# Patient Record
Sex: MALE | Race: WHITE | URBAN - METROPOLITAN AREA
[De-identification: names, ages, dates, MRNs, and addresses within clinical notes are randomized per-mention and may not be internally consistent; named-entity substitution may affect disease eponyms.]

---

## 2017-01-16 RX ORDER — METHYLPHENIDATE HYDROCHLORIDE 20 MG/1
20 TABLET ORAL 2 TIMES DAILY
Qty: 60 TABLET | Refills: 0 | Status: SHIPPED | OUTPATIENT
Start: 2017-01-16 | End: 2017-02-14 | Stop reason: SDUPTHER

## 2017-02-14 RX ORDER — METHYLPHENIDATE HYDROCHLORIDE 20 MG/1
20 TABLET ORAL 2 TIMES DAILY
Qty: 60 TABLET | Refills: 0 | Status: SHIPPED | OUTPATIENT
Start: 2017-02-14 | End: 2017-03-16 | Stop reason: SDUPTHER

## 2017-03-16 RX ORDER — METHYLPHENIDATE HYDROCHLORIDE 20 MG/1
20 TABLET ORAL 2 TIMES DAILY
Qty: 60 TABLET | Refills: 0 | Status: SHIPPED | OUTPATIENT
Start: 2017-03-16 | End: 2017-04-17 | Stop reason: SDUPTHER

## 2017-04-17 RX ORDER — METHYLPHENIDATE HYDROCHLORIDE 20 MG/1
20 TABLET ORAL 2 TIMES DAILY
Qty: 60 TABLET | Refills: 0 | Status: SHIPPED | OUTPATIENT
Start: 2017-04-17 | End: 2017-05-16 | Stop reason: SDUPTHER

## 2017-05-16 RX ORDER — METHYLPHENIDATE HYDROCHLORIDE 20 MG/1
20 TABLET ORAL 2 TIMES DAILY
Qty: 60 TABLET | Refills: 0 | Status: SHIPPED | OUTPATIENT
Start: 2017-05-16 | End: 2017-06-14 | Stop reason: SDUPTHER

## 2017-06-14 RX ORDER — METHYLPHENIDATE HYDROCHLORIDE 20 MG/1
20 TABLET ORAL 2 TIMES DAILY
Qty: 60 TABLET | Refills: 0 | Status: SHIPPED | OUTPATIENT
Start: 2017-06-14 | End: 2017-07-14 | Stop reason: SDUPTHER

## 2017-07-14 ENCOUNTER — TELEPHONE (OUTPATIENT)
Dept: FAMILY MEDICINE CLINIC | Age: 37
End: 2017-07-14

## 2017-07-14 RX ORDER — METHYLPHENIDATE HYDROCHLORIDE 20 MG/1
20 TABLET ORAL 2 TIMES DAILY
Qty: 60 TABLET | Refills: 0 | Status: SHIPPED | OUTPATIENT
Start: 2017-07-14 | End: 2017-08-14 | Stop reason: SDUPTHER

## 2017-08-14 ENCOUNTER — TELEPHONE (OUTPATIENT)
Dept: FAMILY MEDICINE CLINIC | Age: 37
End: 2017-08-14

## 2017-08-14 RX ORDER — METHYLPHENIDATE HYDROCHLORIDE 20 MG/1
20 TABLET ORAL 2 TIMES DAILY
Qty: 60 TABLET | Refills: 0 | Status: SHIPPED | OUTPATIENT
Start: 2017-08-14 | End: 2017-09-12 | Stop reason: SDUPTHER

## 2017-09-12 ENCOUNTER — TELEPHONE (OUTPATIENT)
Dept: FAMILY MEDICINE CLINIC | Age: 37
End: 2017-09-12

## 2017-09-12 RX ORDER — METHYLPHENIDATE HYDROCHLORIDE 20 MG/1
20 TABLET ORAL 2 TIMES DAILY
Qty: 60 TABLET | Refills: 0 | Status: SHIPPED | OUTPATIENT
Start: 2017-09-12 | End: 2017-10-12 | Stop reason: SDUPTHER

## 2017-10-12 ENCOUNTER — TELEPHONE (OUTPATIENT)
Dept: FAMILY MEDICINE CLINIC | Age: 37
End: 2017-10-12

## 2017-10-12 RX ORDER — METHYLPHENIDATE HYDROCHLORIDE 20 MG/1
20 TABLET ORAL 2 TIMES DAILY
Qty: 60 TABLET | Refills: 0 | Status: SHIPPED | OUTPATIENT
Start: 2017-10-12 | End: 2017-11-13 | Stop reason: SDUPTHER

## 2017-10-31 ENCOUNTER — TELEPHONE (OUTPATIENT)
Dept: FAMILY MEDICINE CLINIC | Age: 37
End: 2017-10-31

## 2017-10-31 RX ORDER — FLUOXETINE HYDROCHLORIDE 20 MG/1
20 CAPSULE ORAL DAILY
Qty: 30 CAPSULE | Refills: 1 | Status: SHIPPED | OUTPATIENT
Start: 2017-10-31 | End: 2017-11-22 | Stop reason: SDUPTHER

## 2017-10-31 NOTE — TELEPHONE ENCOUNTER
Disp Refills Start End    FLUoxetine (PROZAC) 20 MG capsule 30 capsule 12 10/20/2016     Sig - Route:  Take 1 capsule by mouth daily - Oral      [facility] [address] in [place] in chart    Next appointment scheduled 11/22/17

## 2017-11-11 ENCOUNTER — TELEPHONE (OUTPATIENT)
Dept: FAMILY MEDICINE CLINIC | Age: 37
End: 2017-11-11

## 2017-11-13 RX ORDER — METHYLPHENIDATE HYDROCHLORIDE 20 MG/1
20 TABLET ORAL 2 TIMES DAILY
Qty: 60 TABLET | Refills: 0 | Status: SHIPPED | OUTPATIENT
Start: 2017-11-13 | End: 2017-12-11 | Stop reason: SDUPTHER

## 2017-11-22 ENCOUNTER — OFFICE VISIT (OUTPATIENT)
Dept: FAMILY MEDICINE CLINIC | Age: 37
End: 2017-11-22

## 2017-11-22 VITALS
DIASTOLIC BLOOD PRESSURE: 70 MMHG | SYSTOLIC BLOOD PRESSURE: 118 MMHG | OXYGEN SATURATION: 99 % | BODY MASS INDEX: 25.84 KG/M2 | HEART RATE: 70 BPM | WEIGHT: 184 LBS

## 2017-11-22 DIAGNOSIS — Z83.3 FAMILY HISTORY OF DIABETES MELLITUS: ICD-10-CM

## 2017-11-22 DIAGNOSIS — Z13.220 ENCOUNTER FOR LIPID SCREENING FOR CARDIOVASCULAR DISEASE: ICD-10-CM

## 2017-11-22 DIAGNOSIS — Z13.6 ENCOUNTER FOR LIPID SCREENING FOR CARDIOVASCULAR DISEASE: ICD-10-CM

## 2017-11-22 DIAGNOSIS — Z11.4 SCREENING FOR HIV (HUMAN IMMUNODEFICIENCY VIRUS): ICD-10-CM

## 2017-11-22 DIAGNOSIS — F60.5 OBSESSIVE-COMPULSIVE PERSONALITY DISORDER (HCC): Primary | ICD-10-CM

## 2017-11-22 DIAGNOSIS — F17.200 TOBACCO DEPENDENCE: ICD-10-CM

## 2017-11-22 DIAGNOSIS — S06.9X0S TRAUMATIC BRAIN INJURY, WITHOUT LOSS OF CONSCIOUSNESS, SEQUELA (HCC): ICD-10-CM

## 2017-11-22 DIAGNOSIS — L64.9 MALE PATTERN BALDNESS: ICD-10-CM

## 2017-11-22 DIAGNOSIS — Z23 NEED FOR VACCINATION: ICD-10-CM

## 2017-11-22 PROBLEM — S06.9XAA TBI (TRAUMATIC BRAIN INJURY) (HCC): Status: ACTIVE | Noted: 2017-11-22

## 2017-11-22 PROBLEM — S06.9XAA TBI (TRAUMATIC BRAIN INJURY) (HCC): Status: RESOLVED | Noted: 2017-11-22 | Resolved: 2017-11-22

## 2017-11-22 PROBLEM — S06.9XAA TBI (TRAUMATIC BRAIN INJURY): Status: ACTIVE | Noted: 2017-11-22

## 2017-11-22 PROCEDURE — G8419 CALC BMI OUT NRM PARAM NOF/U: HCPCS | Performed by: FAMILY MEDICINE

## 2017-11-22 PROCEDURE — 90715 TDAP VACCINE 7 YRS/> IM: CPT | Performed by: FAMILY MEDICINE

## 2017-11-22 PROCEDURE — G8427 DOCREV CUR MEDS BY ELIG CLIN: HCPCS | Performed by: FAMILY MEDICINE

## 2017-11-22 PROCEDURE — G0008 ADMIN INFLUENZA VIRUS VAC: HCPCS | Performed by: FAMILY MEDICINE

## 2017-11-22 PROCEDURE — 4004F PT TOBACCO SCREEN RCVD TLK: CPT | Performed by: FAMILY MEDICINE

## 2017-11-22 PROCEDURE — 90471 IMMUNIZATION ADMIN: CPT | Performed by: FAMILY MEDICINE

## 2017-11-22 PROCEDURE — 99214 OFFICE O/P EST MOD 30 MIN: CPT | Performed by: FAMILY MEDICINE

## 2017-11-22 PROCEDURE — 90630 INFLUENZA, QUADV, 18-64 YRS, ID, PF, MICRO INJ, 0.1ML (FLUZONE QUADV, PF): CPT | Performed by: FAMILY MEDICINE

## 2017-11-22 PROCEDURE — G8484 FLU IMMUNIZE NO ADMIN: HCPCS | Performed by: FAMILY MEDICINE

## 2017-11-22 RX ORDER — FLUOXETINE HYDROCHLORIDE 20 MG/1
20 CAPSULE ORAL DAILY
Qty: 90 CAPSULE | Refills: 3 | Status: SHIPPED | OUTPATIENT
Start: 2017-11-22 | End: 2018-11-29 | Stop reason: SDUPTHER

## 2017-11-22 ASSESSMENT — PATIENT HEALTH QUESTIONNAIRE - PHQ9
2. FEELING DOWN, DEPRESSED OR HOPELESS: 0
1. LITTLE INTEREST OR PLEASURE IN DOING THINGS: 0
SUM OF ALL RESPONSES TO PHQ QUESTIONS 1-9: 0
SUM OF ALL RESPONSES TO PHQ9 QUESTIONS 1 & 2: 0

## 2017-11-22 NOTE — PROGRESS NOTES
Subjective:      Patient ID: Steven Jain is a 44 y.o. male. HPI patient presents today for his annual physical and medication check up. Patient is here today to follow up for their chronic conditions. Feels like OCD and anxiety are stable. Doing well with prozac. Focus and concentration are better with ritalin. Just bought a condo, excited about that. Doesn't like that has receding hair line. Tried rogaine but didn't seem to help. Patient's medications, allergies, past medical, surgical, social and family histories were reviewed and updated in the EHR as appropriate. Review of Systems    Objective:   Physical Exam  Body mass index is 25.84 kg/m². Vitals:    11/22/17 1124   BP: 118/70   Site: Left Arm   Position: Sitting   Cuff Size: Large Adult   Pulse: 70   SpO2: 99%   Weight: 184 lb (83.5 kg)     Wt Readings from Last 3 Encounters:   11/22/17 184 lb (83.5 kg)   11/03/16 176 lb (79.8 kg)   10/06/15 162 lb (73.5 kg)       GENERAL:Alert and oriented x 4 NAD, normal appearing weight, well hydrated, well developed. NECK:supple and non tender without mass, no thyromegaly or thyroid nodules, no cervical lymphadenopathy  LUNG:clear to auscultation bilaterally with normal respiratory effort  CV: Normal heart sounds, regular rate and rhythm without murmurs  EXTREMETY: no loss of hair, no edema, normal pedal pulses bilaterally    PHQ score: PHQ-9 Total Score: 0 (11/22/2017 11:46 AM)      Goes off on tangents, looses track of conversation    Assessment:     Anibal Moore was seen today for annual exam.    Diagnoses and all orders for this visit:    Obsessive-compulsive personality disorder  -Stable, continue current medications. Traumatic brain injury, without loss of consciousness, sequela ([facility] [address]  -Stable, continue current medications. Tobacco dependence  Encouraged to quit, discussed meds, pt states thinks can quit on own.     Need for vaccination  -     INFLUENZA, QUADV, 18-64 YRS, ID, PF, MICRO INJ, 0.1ML (FLUZONE QUADV, PF)  -     Tdap (age 10y-63y) IM (Adacel)    Encounter for lipid screening for cardiovascular disease  -     Lipid Panel; Future    Family history of diabetes mellitus  -     GLUCOSE; Future    Screening for HIV (human immunodeficiency virus)  -     HIV-1 AND HIV-2 ANTIBODIES; Future    Male pattern baldness  Discussed meds, pt will think about it    Other orders  -     FLUoxetine (PROZAC) 20 MG capsule;  Take 1 capsule by mouth daily

## 2017-11-22 NOTE — PROGRESS NOTES
Vaccine Information Sheet, \"Influenza - Inactivated\"  given to Enrique Sutton, or parent/legal guardian of  Enrique Sutton and verbalized understanding. Patient responses:    Have you ever had a reaction to a flu vaccine? No  Are you able to eat eggs without adverse effects? Yes  Do you have any current illness? No  Have you ever had Guillian [place] Syndrome? No    Flu vaccine given per order. Please see immunization tab.

## 2017-12-11 ENCOUNTER — TELEPHONE (OUTPATIENT)
Dept: FAMILY MEDICINE CLINIC | Age: 37
End: 2017-12-11

## 2017-12-12 RX ORDER — METHYLPHENIDATE HYDROCHLORIDE 20 MG/1
20 TABLET ORAL 2 TIMES DAILY
Qty: 60 TABLET | Refills: 0 | Status: SHIPPED | OUTPATIENT
Start: 2017-12-12 | End: 2018-01-09 | Stop reason: SDUPTHER

## 2018-01-09 ENCOUNTER — TELEPHONE (OUTPATIENT)
Dept: FAMILY MEDICINE CLINIC | Age: 38
End: 2018-01-09

## 2018-01-09 RX ORDER — METHYLPHENIDATE HYDROCHLORIDE 20 MG/1
20 TABLET ORAL 2 TIMES DAILY
Qty: 60 TABLET | Refills: 0 | Status: SHIPPED | OUTPATIENT
Start: 2018-01-09 | End: 2018-02-08 | Stop reason: SDUPTHER

## 2018-01-09 NOTE — TELEPHONE ENCOUNTER
methylphenidate (RITALIN) 20 MG tablet 60 tablet 0 12/12/2017     Sig - Route:  Take 1 tablet by mouth 2 times daily . - Oral      [facility] [address] in chart

## 2018-02-08 ENCOUNTER — TELEPHONE (OUTPATIENT)
Dept: FAMILY MEDICINE CLINIC | Age: 38
End: 2018-02-08

## 2018-02-08 RX ORDER — METHYLPHENIDATE HYDROCHLORIDE 20 MG/1
20 TABLET ORAL 2 TIMES DAILY
Qty: 60 TABLET | Refills: 0 | Status: SHIPPED | OUTPATIENT
Start: 2018-02-08 | End: 2018-03-09 | Stop reason: SDUPTHER

## 2018-03-09 ENCOUNTER — TELEPHONE (OUTPATIENT)
Dept: FAMILY MEDICINE CLINIC | Age: 38
End: 2018-03-09

## 2018-03-09 RX ORDER — METHYLPHENIDATE HYDROCHLORIDE 20 MG/1
20 TABLET ORAL 2 TIMES DAILY
Qty: 60 TABLET | Refills: 0 | Status: SHIPPED | OUTPATIENT
Start: 2018-03-09 | End: 2018-04-09 | Stop reason: SDUPTHER

## 2018-04-09 RX ORDER — METHYLPHENIDATE HYDROCHLORIDE 20 MG/1
20 TABLET ORAL 2 TIMES DAILY
Qty: 60 TABLET | Refills: 0 | Status: SHIPPED | OUTPATIENT
Start: 2018-04-09 | End: 2018-05-10 | Stop reason: SDUPTHER

## 2018-05-10 RX ORDER — METHYLPHENIDATE HYDROCHLORIDE 20 MG/1
20 TABLET ORAL 2 TIMES DAILY
Qty: 60 TABLET | Refills: 0 | OUTPATIENT
Start: 2018-05-10 | End: 2018-06-10

## 2018-05-10 RX ORDER — METHYLPHENIDATE HYDROCHLORIDE 20 MG/1
20 TABLET ORAL 2 TIMES DAILY
Qty: 60 TABLET | Refills: 0 | Status: SHIPPED | OUTPATIENT
Start: 2018-05-10 | End: 2018-06-07 | Stop reason: SDUPTHER

## 2018-06-07 DIAGNOSIS — F90.2 ATTENTION DEFICIT HYPERACTIVITY DISORDER (ADHD), COMBINED TYPE: Primary | ICD-10-CM

## 2018-06-07 RX ORDER — METHYLPHENIDATE HYDROCHLORIDE 20 MG/1
20 TABLET ORAL 2 TIMES DAILY
Qty: 60 TABLET | Refills: 0 | Status: SHIPPED | OUTPATIENT
Start: 2018-06-07 | End: 2018-07-06 | Stop reason: SDUPTHER

## 2018-07-06 DIAGNOSIS — F90.2 ATTENTION DEFICIT HYPERACTIVITY DISORDER (ADHD), COMBINED TYPE: ICD-10-CM

## 2018-07-06 RX ORDER — METHYLPHENIDATE HYDROCHLORIDE 20 MG/1
20 TABLET ORAL 2 TIMES DAILY
Qty: 60 TABLET | Refills: 0 | Status: SHIPPED | OUTPATIENT
Start: 2018-07-06 | End: 2018-08-06 | Stop reason: SDUPTHER

## 2018-07-06 NOTE — TELEPHONE ENCOUNTER
methylphenidate (RITALIN) 20 MG tablet 60 tablet 0 6/7/2018 7/8/2018    Sig - Route: Take 1 tablet by mouth 2 times daily for 31 days. . - Oral      [facility] in chart

## 2018-08-04 DIAGNOSIS — F90.2 ATTENTION DEFICIT HYPERACTIVITY DISORDER (ADHD), COMBINED TYPE: ICD-10-CM

## 2018-08-06 RX ORDER — METHYLPHENIDATE HYDROCHLORIDE 20 MG/1
20 TABLET ORAL 2 TIMES DAILY
Qty: 60 TABLET | Refills: 0 | Status: SHIPPED | OUTPATIENT
Start: 2018-08-06 | End: 2018-09-04 | Stop reason: SDUPTHER

## 2018-09-04 ENCOUNTER — TELEPHONE (OUTPATIENT)
Dept: FAMILY MEDICINE CLINIC | Age: 38
End: 2018-09-04

## 2018-09-04 DIAGNOSIS — F90.2 ATTENTION DEFICIT HYPERACTIVITY DISORDER (ADHD), COMBINED TYPE: ICD-10-CM

## 2018-09-04 NOTE — TELEPHONE ENCOUNTER
Disp Refills Start End    methylphenidate (RITALIN) 20 MG tablet 60 tablet 0 8/6/2018 9/6/2018    Sig - Route: Take 1 tablet by mouth 2 times daily for 31 days. . - Oral      [facility] on [address] in chart     Advised Dr. Nupur Carrillo out of office today.

## 2018-09-05 RX ORDER — METHYLPHENIDATE HYDROCHLORIDE 20 MG/1
20 TABLET ORAL 2 TIMES DAILY
Qty: 60 TABLET | Refills: 0 | Status: SHIPPED | OUTPATIENT
Start: 2018-09-05 | End: 2018-10-03 | Stop reason: SDUPTHER

## 2018-10-03 DIAGNOSIS — F90.2 ATTENTION DEFICIT HYPERACTIVITY DISORDER (ADHD), COMBINED TYPE: ICD-10-CM

## 2018-10-03 RX ORDER — METHYLPHENIDATE HYDROCHLORIDE 20 MG/1
20 TABLET ORAL 2 TIMES DAILY
Qty: 60 TABLET | Refills: 0 | Status: SHIPPED | OUTPATIENT
Start: 2018-10-03 | End: 2018-11-01 | Stop reason: SDUPTHER

## 2018-11-01 DIAGNOSIS — F90.2 ATTENTION DEFICIT HYPERACTIVITY DISORDER (ADHD), COMBINED TYPE: ICD-10-CM

## 2018-11-01 RX ORDER — METHYLPHENIDATE HYDROCHLORIDE 20 MG/1
20 TABLET ORAL 2 TIMES DAILY
Qty: 60 TABLET | Refills: 0 | Status: SHIPPED | OUTPATIENT
Start: 2018-11-01 | End: 2018-11-29 | Stop reason: SDUPTHER

## 2018-11-29 ENCOUNTER — OFFICE VISIT (OUTPATIENT)
Dept: FAMILY MEDICINE CLINIC | Age: 38
End: 2018-11-29
Payer: MEDICARE

## 2018-11-29 VITALS
BODY MASS INDEX: 25.9 KG/M2 | DIASTOLIC BLOOD PRESSURE: 80 MMHG | SYSTOLIC BLOOD PRESSURE: 138 MMHG | WEIGHT: 185 LBS | OXYGEN SATURATION: 98 % | HEIGHT: 71 IN | HEART RATE: 80 BPM

## 2018-11-29 DIAGNOSIS — F90.2 ATTENTION DEFICIT HYPERACTIVITY DISORDER (ADHD), COMBINED TYPE: Primary | ICD-10-CM

## 2018-11-29 DIAGNOSIS — S06.9X0S TRAUMATIC BRAIN INJURY, WITHOUT LOSS OF CONSCIOUSNESS, SEQUELA (HCC): ICD-10-CM

## 2018-11-29 DIAGNOSIS — F60.5 OBSESSIVE-COMPULSIVE PERSONALITY DISORDER (HCC): ICD-10-CM

## 2018-11-29 PROCEDURE — 99214 OFFICE O/P EST MOD 30 MIN: CPT | Performed by: FAMILY MEDICINE

## 2018-11-29 PROCEDURE — G8427 DOCREV CUR MEDS BY ELIG CLIN: HCPCS | Performed by: FAMILY MEDICINE

## 2018-11-29 PROCEDURE — G8484 FLU IMMUNIZE NO ADMIN: HCPCS | Performed by: FAMILY MEDICINE

## 2018-11-29 PROCEDURE — 4004F PT TOBACCO SCREEN RCVD TLK: CPT | Performed by: FAMILY MEDICINE

## 2018-11-29 PROCEDURE — G8419 CALC BMI OUT NRM PARAM NOF/U: HCPCS | Performed by: FAMILY MEDICINE

## 2018-11-29 RX ORDER — FLUOXETINE HYDROCHLORIDE 20 MG/1
20 CAPSULE ORAL DAILY
Qty: 90 CAPSULE | Refills: 3 | Status: SHIPPED | OUTPATIENT
Start: 2018-11-29 | End: 2019-12-03 | Stop reason: SDUPTHER

## 2018-11-29 RX ORDER — METHYLPHENIDATE HYDROCHLORIDE 20 MG/1
20 TABLET ORAL 2 TIMES DAILY
Qty: 60 TABLET | Refills: 0 | Status: SHIPPED | OUTPATIENT
Start: 2018-11-29 | End: 2018-12-26 | Stop reason: SDUPTHER

## 2018-12-26 DIAGNOSIS — F90.2 ATTENTION DEFICIT HYPERACTIVITY DISORDER (ADHD), COMBINED TYPE: ICD-10-CM

## 2018-12-26 RX ORDER — METHYLPHENIDATE HYDROCHLORIDE 20 MG/1
20 TABLET ORAL 2 TIMES DAILY
Qty: 60 TABLET | Refills: 0 | Status: SHIPPED | OUTPATIENT
Start: 2018-12-26 | End: 2019-01-24 | Stop reason: SDUPTHER

## 2018-12-26 NOTE — TELEPHONE ENCOUNTER
Medication:   Requested Prescriptions     Pending Prescriptions Disp Refills    methylphenidate (RITALIN) 20 MG tablet 60 tablet 0     Sig: Take 1 tablet by mouth 2 times daily for 31 days. Pearle Milling Date: 12/26/18      Last Filled:  11/29/18    Patient Phone Number: [phone] (home)     Last appt: 11/29/2018   Next appt: Visit date not found    Last OARRS: No flowsheet data found.     Preferred Pharmacy:   [facility] [place], [address] [place] [address] [address] [place] [phone]  Phone: [phone] Fax: [phone]    [facility] [address], [address] [place] [address] Blayne Lazcano 1998 808 [facility]  [address] Y[address] Lugo [address]  Phone: [phone] Fax: [phone]

## 2019-01-24 DIAGNOSIS — F90.2 ATTENTION DEFICIT HYPERACTIVITY DISORDER (ADHD), COMBINED TYPE: ICD-10-CM

## 2019-01-24 DIAGNOSIS — F42.9 OBSESSIVE-COMPULSIVE DISORDER, UNSPECIFIED TYPE: Primary | ICD-10-CM

## 2019-01-24 RX ORDER — METHYLPHENIDATE HYDROCHLORIDE 10 MG/1
20 TABLET ORAL 2 TIMES DAILY
Qty: 120 TABLET | Refills: 0 | Status: SHIPPED | OUTPATIENT
Start: 2019-01-24 | End: 2019-02-22 | Stop reason: ALTCHOICE

## 2019-01-24 RX ORDER — METHYLPHENIDATE HYDROCHLORIDE 20 MG/1
20 TABLET ORAL 2 TIMES DAILY
Qty: 60 TABLET | Refills: 0 | Status: SHIPPED | OUTPATIENT
Start: 2019-01-24 | End: 2019-02-22 | Stop reason: SDUPTHER

## 2019-02-22 DIAGNOSIS — F90.2 ATTENTION DEFICIT HYPERACTIVITY DISORDER (ADHD), COMBINED TYPE: ICD-10-CM

## 2019-02-22 RX ORDER — METHYLPHENIDATE HYDROCHLORIDE 20 MG/1
20 TABLET ORAL 2 TIMES DAILY
Qty: 60 TABLET | Refills: 0 | Status: SHIPPED | OUTPATIENT
Start: 2019-02-22 | End: 2019-03-22 | Stop reason: SDUPTHER

## 2019-03-22 DIAGNOSIS — F90.2 ATTENTION DEFICIT HYPERACTIVITY DISORDER (ADHD), COMBINED TYPE: ICD-10-CM

## 2019-03-22 RX ORDER — METHYLPHENIDATE HYDROCHLORIDE 20 MG/1
20 TABLET ORAL 2 TIMES DAILY
Qty: 60 TABLET | Refills: 0 | Status: SHIPPED | OUTPATIENT
Start: 2019-03-22 | End: 2019-04-23 | Stop reason: SDUPTHER

## 2019-04-20 DIAGNOSIS — F90.2 ATTENTION DEFICIT HYPERACTIVITY DISORDER (ADHD), COMBINED TYPE: ICD-10-CM

## 2019-04-22 DIAGNOSIS — F90.2 ATTENTION DEFICIT HYPERACTIVITY DISORDER (ADHD), COMBINED TYPE: ICD-10-CM

## 2019-04-22 RX ORDER — METHYLPHENIDATE HYDROCHLORIDE 20 MG/1
20 TABLET ORAL 2 TIMES DAILY
Qty: 60 TABLET | Refills: 0 | Status: CANCELLED | OUTPATIENT
Start: 2019-04-22 | End: 2019-05-23

## 2019-04-23 RX ORDER — METHYLPHENIDATE HYDROCHLORIDE 20 MG/1
20 TABLET ORAL 2 TIMES DAILY
Qty: 60 TABLET | Refills: 0 | Status: SHIPPED | OUTPATIENT
Start: 2019-04-23 | End: 2019-05-21 | Stop reason: SDUPTHER

## 2019-05-21 DIAGNOSIS — F90.2 ATTENTION DEFICIT HYPERACTIVITY DISORDER (ADHD), COMBINED TYPE: ICD-10-CM

## 2019-05-21 RX ORDER — METHYLPHENIDATE HYDROCHLORIDE 20 MG/1
20 TABLET ORAL 2 TIMES DAILY
Qty: 60 TABLET | Refills: 0 | Status: SHIPPED | OUTPATIENT
Start: 2019-05-21 | End: 2019-06-20 | Stop reason: SDUPTHER

## 2019-06-18 DIAGNOSIS — F90.2 ATTENTION DEFICIT HYPERACTIVITY DISORDER (ADHD), COMBINED TYPE: ICD-10-CM

## 2019-06-18 NOTE — TELEPHONE ENCOUNTER
methylphenidate (RITALIN) 20 MG tablet 60 tablet 0 5/21/2019 6/21/2019    Sig - Route:  Take 1 tablet by mouth 2 times daily for 31 days      Pharmacy:  [address] [place] [address] [place], [address] [facility] 115 [phone]

## 2019-06-19 NOTE — TELEPHONE ENCOUNTER
Medication:   Requested Prescriptions     Pending Prescriptions Disp Refills    methylphenidate (RITALIN) 20 MG tablet 60 tablet 0     Sig: Take 1 tablet by mouth 2 times daily for 31 days. Last Filled:  5/21/2019    Patient Phone Number: [phone] (home)     Last appt: 11/29/2018   Next appt: Visit date not found    Last OARRS: No flowsheet data found.     Preferred Pharmacy:   [place] [facility] [address] [place] [address] [address] [place] [address] 9045 [facility] [phone]  Phone: [phone] Fax: [phone]

## 2019-06-20 RX ORDER — METHYLPHENIDATE HYDROCHLORIDE 20 MG/1
20 TABLET ORAL 2 TIMES DAILY
Qty: 60 TABLET | Refills: 0 | Status: SHIPPED | OUTPATIENT
Start: 2019-06-20 | End: 2019-07-19 | Stop reason: SDUPTHER

## 2019-07-18 DIAGNOSIS — F90.2 ATTENTION DEFICIT HYPERACTIVITY DISORDER (ADHD), COMBINED TYPE: ICD-10-CM

## 2019-07-19 RX ORDER — METHYLPHENIDATE HYDROCHLORIDE 20 MG/1
20 TABLET ORAL 2 TIMES DAILY
Qty: 60 TABLET | Refills: 0 | Status: SHIPPED | OUTPATIENT
Start: 2019-07-19 | End: 2019-08-16 | Stop reason: SDUPTHER

## 2019-08-15 DIAGNOSIS — F90.2 ATTENTION DEFICIT HYPERACTIVITY DISORDER (ADHD), COMBINED TYPE: ICD-10-CM

## 2019-08-16 RX ORDER — METHYLPHENIDATE HYDROCHLORIDE 20 MG/1
20 TABLET ORAL 2 TIMES DAILY
Qty: 60 TABLET | Refills: 0 | Status: SHIPPED | OUTPATIENT
Start: 2019-08-16 | End: 2019-09-12 | Stop reason: SDUPTHER

## 2019-09-12 DIAGNOSIS — F90.2 ATTENTION DEFICIT HYPERACTIVITY DISORDER (ADHD), COMBINED TYPE: ICD-10-CM

## 2019-09-12 RX ORDER — METHYLPHENIDATE HYDROCHLORIDE 20 MG/1
20 TABLET ORAL 2 TIMES DAILY
Qty: 60 TABLET | Refills: 0 | Status: SHIPPED | OUTPATIENT
Start: 2019-09-12 | End: 2019-10-11 | Stop reason: SDUPTHER

## 2019-09-12 RX ORDER — DEXTROAMPHETAMINE SACCHARATE, AMPHETAMINE ASPARTATE, DEXTROAMPHETAMINE SULFATE AND AMPHETAMINE SULFATE 5; 5; 5; 5 MG/1; MG/1; MG/1; MG/1
20 TABLET ORAL 2 TIMES DAILY
Qty: 60 TABLET | Refills: 0 | Status: SHIPPED | OUTPATIENT
Start: 2019-09-12 | End: 2019-10-11 | Stop reason: ALTCHOICE

## 2019-10-10 DIAGNOSIS — F90.2 ATTENTION DEFICIT HYPERACTIVITY DISORDER (ADHD), COMBINED TYPE: ICD-10-CM

## 2019-10-11 RX ORDER — METHYLPHENIDATE HYDROCHLORIDE 20 MG/1
20 TABLET ORAL 2 TIMES DAILY
Qty: 60 TABLET | Refills: 0 | Status: SHIPPED | OUTPATIENT
Start: 2019-10-11 | End: 2019-11-08 | Stop reason: SDUPTHER

## 2019-11-08 DIAGNOSIS — F90.2 ATTENTION DEFICIT HYPERACTIVITY DISORDER (ADHD), COMBINED TYPE: ICD-10-CM

## 2019-11-08 RX ORDER — METHYLPHENIDATE HYDROCHLORIDE 20 MG/1
20 TABLET ORAL 2 TIMES DAILY
Qty: 60 TABLET | Refills: 0 | Status: SHIPPED | OUTPATIENT
Start: 2019-11-08 | End: 2019-12-03 | Stop reason: SDUPTHER

## 2019-12-03 ENCOUNTER — OFFICE VISIT (OUTPATIENT)
Dept: FAMILY MEDICINE CLINIC | Age: 39
End: 2019-12-03
Payer: MEDICARE

## 2019-12-03 ENCOUNTER — TELEPHONE (OUTPATIENT)
Dept: FAMILY MEDICINE CLINIC | Age: 39
End: 2019-12-03

## 2019-12-03 VITALS
WEIGHT: 187 LBS | HEIGHT: 72 IN | SYSTOLIC BLOOD PRESSURE: 124 MMHG | OXYGEN SATURATION: 98 % | HEART RATE: 81 BPM | DIASTOLIC BLOOD PRESSURE: 80 MMHG | BODY MASS INDEX: 25.33 KG/M2

## 2019-12-03 DIAGNOSIS — Z13.1 SCREENING FOR DIABETES MELLITUS: ICD-10-CM

## 2019-12-03 DIAGNOSIS — Z11.4 SCREENING FOR HIV (HUMAN IMMUNODEFICIENCY VIRUS): ICD-10-CM

## 2019-12-03 DIAGNOSIS — F17.200 TOBACCO DEPENDENCE: ICD-10-CM

## 2019-12-03 DIAGNOSIS — G56.21 ULNAR NEUROPATHY AT WRIST, RIGHT: ICD-10-CM

## 2019-12-03 DIAGNOSIS — F90.2 ATTENTION DEFICIT HYPERACTIVITY DISORDER (ADHD), COMBINED TYPE: Primary | ICD-10-CM

## 2019-12-03 DIAGNOSIS — F60.5 OBSESSIVE-COMPULSIVE PERSONALITY DISORDER (HCC): ICD-10-CM

## 2019-12-03 DIAGNOSIS — Z13.220 ENCOUNTER FOR LIPID SCREENING FOR CARDIOVASCULAR DISEASE: ICD-10-CM

## 2019-12-03 DIAGNOSIS — Z23 NEED FOR VACCINATION: ICD-10-CM

## 2019-12-03 DIAGNOSIS — Z13.6 ENCOUNTER FOR LIPID SCREENING FOR CARDIOVASCULAR DISEASE: ICD-10-CM

## 2019-12-03 DIAGNOSIS — L64.9 MALE PATTERN BALDNESS: ICD-10-CM

## 2019-12-03 PROCEDURE — G8419 CALC BMI OUT NRM PARAM NOF/U: HCPCS | Performed by: FAMILY MEDICINE

## 2019-12-03 PROCEDURE — 90686 IIV4 VACC NO PRSV 0.5 ML IM: CPT | Performed by: FAMILY MEDICINE

## 2019-12-03 PROCEDURE — G8482 FLU IMMUNIZE ORDER/ADMIN: HCPCS | Performed by: FAMILY MEDICINE

## 2019-12-03 PROCEDURE — 99214 OFFICE O/P EST MOD 30 MIN: CPT | Performed by: FAMILY MEDICINE

## 2019-12-03 PROCEDURE — 4004F PT TOBACCO SCREEN RCVD TLK: CPT | Performed by: FAMILY MEDICINE

## 2019-12-03 PROCEDURE — G0008 ADMIN INFLUENZA VIRUS VAC: HCPCS | Performed by: FAMILY MEDICINE

## 2019-12-03 PROCEDURE — G8427 DOCREV CUR MEDS BY ELIG CLIN: HCPCS | Performed by: FAMILY MEDICINE

## 2019-12-03 RX ORDER — METHYLPHENIDATE HYDROCHLORIDE 20 MG/1
20 TABLET ORAL 2 TIMES DAILY
Qty: 60 TABLET | Refills: 0 | Status: SHIPPED | OUTPATIENT
Start: 2019-12-03 | End: 2020-01-06 | Stop reason: SDUPTHER

## 2019-12-03 RX ORDER — FINASTERIDE 1 MG/1
1 TABLET, FILM COATED ORAL DAILY
Qty: 30 TABLET | Refills: 12 | Status: SHIPPED | OUTPATIENT
Start: 2019-12-03 | End: 2020-12-03 | Stop reason: CLARIF

## 2019-12-03 RX ORDER — FLUTICASONE PROPIONATE 50 MCG
2 SPRAY, SUSPENSION (ML) NASAL DAILY
Qty: 3 BOTTLE | Refills: 5 | Status: SHIPPED | OUTPATIENT
Start: 2019-12-03 | End: 2020-12-03 | Stop reason: SDUPTHER

## 2019-12-03 RX ORDER — FLUOXETINE HYDROCHLORIDE 20 MG/1
20 CAPSULE ORAL DAILY
Qty: 90 CAPSULE | Refills: 3 | Status: SHIPPED | OUTPATIENT
Start: 2019-12-03 | End: 2020-12-03 | Stop reason: SDUPTHER

## 2019-12-03 ASSESSMENT — PATIENT HEALTH QUESTIONNAIRE - PHQ9
10. IF YOU CHECKED OFF ANY PROBLEMS, HOW DIFFICULT HAVE THESE PROBLEMS MADE IT FOR YOU TO DO YOUR WORK, TAKE CARE OF THINGS AT HOME, OR GET ALONG WITH OTHER PEOPLE: 2
2. FEELING DOWN, DEPRESSED OR HOPELESS: 3
7. TROUBLE CONCENTRATING ON THINGS, SUCH AS READING THE NEWSPAPER OR WATCHING TELEVISION: 3
3. TROUBLE FALLING OR STAYING ASLEEP: 1
SUM OF ALL RESPONSES TO PHQ QUESTIONS 1-9: 9
6. FEELING BAD ABOUT YOURSELF - OR THAT YOU ARE A FAILURE OR HAVE LET YOURSELF OR YOUR FAMILY DOWN: 2
8. MOVING OR SPEAKING SO SLOWLY THAT OTHER PEOPLE COULD HAVE NOTICED. OR THE OPPOSITE, BEING SO FIGETY OR RESTLESS THAT YOU HAVE BEEN MOVING AROUND A LOT MORE THAN USUAL: 0
SUM OF ALL RESPONSES TO PHQ9 QUESTIONS 1 & 2: 3
9. THOUGHTS THAT YOU WOULD BE BETTER OFF DEAD, OR OF HURTING YOURSELF: 0
5. POOR APPETITE OR OVEREATING: 0
1. LITTLE INTEREST OR PLEASURE IN DOING THINGS: 0
SUM OF ALL RESPONSES TO PHQ QUESTIONS 1-9: 9

## 2020-01-06 RX ORDER — METHYLPHENIDATE HYDROCHLORIDE 20 MG/1
20 TABLET ORAL 2 TIMES DAILY
Qty: 60 TABLET | Refills: 0 | Status: SHIPPED | OUTPATIENT
Start: 2020-01-06 | End: 2020-02-05 | Stop reason: SDUPTHER

## 2020-01-06 NOTE — TELEPHONE ENCOUNTER
Medication:   Requested Prescriptions     Pending Prescriptions Disp Refills    methylphenidate (RITALIN) 20 MG tablet 60 tablet 0     Sig: Take 1 tablet by mouth 2 times daily for 30 days. Last Filled: 12/3/19    Patient Phone Number: [phone] (home)     Last appt: 12/3/2019   Next appt: Visit date not found    Last OARRS: No flowsheet data found.   PDMP Monitoring:    Last PDMP Aron Oneil as Reviewed [facility]):  Review User Review Instant Review Result   Anant Oakley 12/3/2019 12:25 PM Reviewed PDMP [1]     Preferred Pharmacy:   [address], [address] [place] [address] [address] [place] [address]  Elizabeth Solis [phone]  Phone: [phone] Fax: [phone]

## 2020-02-05 RX ORDER — METHYLPHENIDATE HYDROCHLORIDE 20 MG/1
20 TABLET ORAL 2 TIMES DAILY
Qty: 60 TABLET | Refills: 0 | Status: SHIPPED | OUTPATIENT
Start: 2020-02-05 | End: 2020-03-04 | Stop reason: SDUPTHER

## 2020-02-05 NOTE — TELEPHONE ENCOUNTER
Med refills  Med: methylphenidate  Pharmacy: [facility]  Last ov: 12/3/19  Next ov: Not scheduled
Stable

## 2020-03-04 NOTE — TELEPHONE ENCOUNTER
Patient requesting a medication refill.   Medication: methylphenidate (RITALIN) 20 MG tablet        Pharmacy: [address], [address] 675 [facility] [phone] Eulalio Mack [phone]  Last office visit: 12/03/19  Next office visit: Visit date not found

## 2020-03-04 NOTE — TELEPHONE ENCOUNTER
Medication:   Requested Prescriptions      No prescriptions requested or ordered in this encounter      Last Filled:  2/5/20    Patient Phone Number: [phone] (home)     Last appt: 12/3/2019   Next appt: Visit date not found    Last OARRS: No flowsheet data found.   PDMP Monitoring:    Last PDMP Lena Jovel as Reviewed [facility]):  Review User Review Instant Review Result   Luke Valdez 12/3/2019 12:25 PM Reviewed PDMP [1]     Preferred Pharmacy:   [address], [address] [place] [address] [address] [place] [phone]  Phone: [phone] Fax: [phone]

## 2020-03-05 RX ORDER — METHYLPHENIDATE HYDROCHLORIDE 20 MG/1
20 TABLET ORAL 2 TIMES DAILY
Qty: 60 TABLET | Refills: 0 | Status: SHIPPED | OUTPATIENT
Start: 2020-03-05 | End: 2020-03-30 | Stop reason: SDUPTHER

## 2020-03-30 RX ORDER — METHYLPHENIDATE HYDROCHLORIDE 20 MG/1
20 TABLET ORAL 2 TIMES DAILY
Qty: 60 TABLET | Refills: 0 | Status: SHIPPED | OUTPATIENT
Start: 2020-03-30 | End: 2020-04-29 | Stop reason: SDUPTHER

## 2020-04-15 ENCOUNTER — TELEPHONE (OUTPATIENT)
Dept: FAMILY MEDICINE CLINIC | Age: 40
End: 2020-04-15

## 2020-04-21 ENCOUNTER — TELEPHONE (OUTPATIENT)
Dept: FAMILY MEDICINE CLINIC | Age: 40
End: 2020-04-21

## 2020-04-21 NOTE — TELEPHONE ENCOUNTER
Please call pt, tell him I can not find a copy of the neurocognitive assessment he had done in 2010 in his Epic chart. Probably done by Dr. Fred Vizcaino. Ask pt if he has a copy of this and if so if he can send us a copy to put in his chart.

## 2020-04-22 ENCOUNTER — TELEPHONE (OUTPATIENT)
Dept: FAMILY MEDICINE CLINIC | Age: 40
End: 2020-04-22

## 2020-04-22 NOTE — TELEPHONE ENCOUNTER
filled out med reports authorization forms, office should be reciving them by them merced, if any questions call pt.

## 2020-04-27 ENCOUNTER — TELEPHONE (OUTPATIENT)
Dept: FAMILY MEDICINE CLINIC | Age: 40
End: 2020-04-27

## 2020-04-28 NOTE — TELEPHONE ENCOUNTER
Medication:   Requested Prescriptions     Pending Prescriptions Disp Refills    methylphenidate (RITALIN) 20 MG tablet 60 tablet 0     Sig: Take 1 tablet by mouth 2 times daily for 30 days. Last Filled:  3/30/2020    Patient Phone Number: [phone] (home)     Last appt: 12/3/2019  Next appt: Visit date not found    Last OARRS: No flowsheet data found.   PDMP Monitoring:    Last PDMP Hugo Hence as Reviewed [facility]):  Review User Review Instant Review Result   Maria CAlorum 12/3/2019 12:25 PM Reviewed PDMP [1]     Preferred Pharmacy:   [address], [address] [place] [address] 6235 [facility] [phone]  Phone: [phone] Fax: [phone]

## 2020-04-29 RX ORDER — METHYLPHENIDATE HYDROCHLORIDE 20 MG/1
20 TABLET ORAL 2 TIMES DAILY
Qty: 60 TABLET | Refills: 0 | Status: SHIPPED | OUTPATIENT
Start: 2020-04-29 | End: 2020-05-28 | Stop reason: SDUPTHER

## 2020-04-29 RX ORDER — METHYLPHENIDATE HYDROCHLORIDE 20 MG/1
20 TABLET ORAL 2 TIMES DAILY
Qty: 60 TABLET | Refills: 0 | Status: SHIPPED | OUTPATIENT
Start: 2020-04-29 | End: 2020-04-29 | Stop reason: SDUPTHER

## 2020-05-12 ENCOUNTER — TELEPHONE (OUTPATIENT)
Dept: FAMILY MEDICINE CLINIC | Age: 40
End: 2020-05-12

## 2020-05-27 ENCOUNTER — TELEPHONE (OUTPATIENT)
Dept: FAMILY MEDICINE CLINIC | Age: 40
End: 2020-05-27

## 2020-05-27 NOTE — TELEPHONE ENCOUNTER
Patient requesting a medication refill.     Medication: Methylphenidate (RITALIN) 20 MG tablet        Pharmacy: [address], [address] 816 [facility] [phone] Supriya Coreas [phone]      Last office visit: 12/3/2019  Next office visit: Visit date not found

## 2020-05-28 RX ORDER — METHYLPHENIDATE HYDROCHLORIDE 20 MG/1
20 TABLET ORAL 2 TIMES DAILY
Qty: 60 TABLET | Refills: 0 | Status: SHIPPED | OUTPATIENT
Start: 2020-05-28 | End: 2020-06-29 | Stop reason: SDUPTHER

## 2020-05-28 NOTE — TELEPHONE ENCOUNTER
Medication:   Requested Prescriptions     Pending Prescriptions Disp Refills    methylphenidate (RITALIN) 20 MG tablet 60 tablet 0     Sig: Take 1 tablet by mouth 2 times daily for 30 days. Last Filled:  04/29/2020    Patient Phone Number: [phone] (home)     Last appt: 12/3/2019   Next appt: Visit date not found    Last OARRS: No flowsheet data found.   PDMP Monitoring:    Last PDMP Shilpi Noland as Reviewed [facility]):  Review User Review Instant Review Result   Maria M Magallon 12/3/2019 12:25 PM Reviewed PDMP [1]     Preferred Pharmacy:   [address], [address] [place] [address] [address] [place] [address]  Elizabeth Solis [phone]  Phone: [phone] Fax: [phone]

## 2020-06-09 ENCOUNTER — TELEPHONE (OUTPATIENT)
Dept: FAMILY MEDICINE CLINIC | Age: 40
End: 2020-06-09

## 2020-06-09 NOTE — TELEPHONE ENCOUNTER
LMOM    Dr. Juanita Kramer is referring to PT for Residual Functional Capacity Assessment. We can fax or email the referral to the patient, he may go where ever he chooses.

## 2020-06-26 NOTE — TELEPHONE ENCOUNTER
Patient requesting a medication refill.   Medication: methylphenidate (RITALIN) 20 MG tablet   Pharmacy: [facility]  Last office visit:   Next office visit: Visit date not found

## 2020-06-29 RX ORDER — METHYLPHENIDATE HYDROCHLORIDE 20 MG/1
20 TABLET ORAL 2 TIMES DAILY
Qty: 60 TABLET | Refills: 0 | Status: SHIPPED | OUTPATIENT
Start: 2020-06-29 | End: 2020-07-24 | Stop reason: SDUPTHER

## 2020-06-29 NOTE — TELEPHONE ENCOUNTER
Medication:   Requested Prescriptions     Pending Prescriptions Disp Refills    methylphenidate (RITALIN) 20 MG tablet 60 tablet 0     Sig: Take 1 tablet by mouth 2 times daily for 30 days. Last Filled:  5/28/2020    Patient Phone Number: [phone] (home)     Last appt: 12/3/2019   Next appt: Visit date not found    Last OARRS: No flowsheet data found.   PDMP Monitoring:    Last PDMP Sherryle Aase as Reviewed [facility]):  Review User Review Instant Review Result   Gilberto Elaine 12/3/2019 12:25 PM Reviewed PDMP [1]     Preferred Pharmacy:   [address], [address] [place] [address] [address] [place] [address]  Elizabeth Solis [phone]  Phone: [phone] Fax: [phone]

## 2020-07-24 RX ORDER — METHYLPHENIDATE HYDROCHLORIDE 20 MG/1
20 TABLET ORAL 2 TIMES DAILY
Qty: 60 TABLET | Refills: 0 | Status: SHIPPED | OUTPATIENT
Start: 2020-07-24 | End: 2020-08-24 | Stop reason: SDUPTHER

## 2020-07-24 NOTE — TELEPHONE ENCOUNTER
----- Message from Maria Del Rosario Salguero sent at 7/23/2020  5:19 PM EDT -----  Subject: Refill Request    QUESTIONS  Name of Medication? methylphenidate (RITALIN) 20 MG tablet  Patient-reported dosage and instructions? 20 mg   How many days do you have left? 7  Preferred Pharmacy? [facility] [phone] [facility]   Pharmacy phone number (if available)? [phone]  Additional Information for Provider? pt stated he has some medication left   he just wanted to call early to make sure he gets his prescription when he   runs out  ---------------------------------------------------------------------------  --------------  CALL BACK INFO  What is the best way for the office to contact you? OK to leave message on   voicemail  Preferred Call Back Phone Number?  [phone]

## 2020-07-24 NOTE — TELEPHONE ENCOUNTER
Medication:   Requested Prescriptions      No prescriptions requested or ordered in this encounter      Last Filled:  6/    Patient Phone Number: [phone] (home)     Last appt: 12/3/2019   Next appt: Visit date not found    Last OARRS: No flowsheet data found.   PDMP Monitoring:    Last PDMP Earlis Angry as Reviewed [facility]):  Review User Review Instant Review Result   Deann Everardo 12/3/2019 12:25 PM Reviewed PDMP [1]     Preferred Pharmacy:   [address], [address] [place] [address] [address] [place] [phone]  Phone: [phone] Fax: [phone]

## 2020-08-24 RX ORDER — METHYLPHENIDATE HYDROCHLORIDE 20 MG/1
20 TABLET ORAL 2 TIMES DAILY
Qty: 60 TABLET | Refills: 0 | Status: SHIPPED | OUTPATIENT
Start: 2020-08-24 | End: 2020-09-17 | Stop reason: SDUPTHER

## 2020-08-24 NOTE — TELEPHONE ENCOUNTER
----- Message from Trinidad Patino sent at 8/21/2020  4:57 PM EDT -----  Subject: Refill Request    QUESTIONS  Name of Medication? methylphenidate (RITALIN) 20 MG tablet  Patient-reported dosage and instructions? one tablet twice a day  How many days do you have left? 0  Preferred Pharmacy? [facility] [phone]   Pharmacy phone number (if available)? [phone]  Additional Information for Provider?   ---------------------------------------------------------------------------  --------------  [address]  What is the best way for the office to contact you? OK to leave message on   voicemail  Preferred Call Back Phone Number?  [phone]

## 2020-08-24 NOTE — TELEPHONE ENCOUNTER
Medication:   Requested Prescriptions     Pending Prescriptions Disp Refills    methylphenidate (RITALIN) 20 MG tablet 60 tablet 0     Sig: Take 1 tablet by mouth 2 times daily for 30 days. Last Filled: 7/24/20    Patient Phone Number: [phone] (home)     Last appt: 12/3/2019   Next appt: Visit date not found    Last OARRS: No flowsheet data found.   PDMP Monitoring:    Last PDMP [place] as Reviewed [facility]):  Review User Review Instant Review Result   Sachin Ceron 12/3/2019 12:25 PM Reviewed PDMP [1]     Preferred Pharmacy:   [address], [address] [place] [address] 6378 [facility]  Elizabeth Solis [phone]  Phone: [phone] Fax: [phone]

## 2020-09-03 ENCOUNTER — TELEPHONE (OUTPATIENT)
Dept: FAMILY MEDICINE CLINIC | Age: 40
End: 2020-09-03

## 2020-09-03 NOTE — TELEPHONE ENCOUNTER
He does not need to come in but not sure where he needs to go, if he gets a name of a place we can refer.  Also, not sure if this is for psychological or physical

## 2020-09-03 NOTE — TELEPHONE ENCOUNTER
Spoke with pt advised him he needs to get this eval from physical therapy. Order was mailed to him several weeks ago.

## 2020-09-04 ENCOUNTER — TELEPHONE (OUTPATIENT)
Dept: FAMILY MEDICINE CLINIC | Age: 40
End: 2020-09-04

## 2020-09-04 NOTE — TELEPHONE ENCOUNTER
Patient notified referral is in the system and we mailed him a copy back in June.   Patient aware and will take with him to therapy for referral.

## 2020-09-04 NOTE — TELEPHONE ENCOUNTER
----- Message from Cecil Schwartz sent at 9/4/2020  1:58 PM EDT -----  Subject: Referral Request    QUESTIONS   Reason for referral request? FCE for physical Therapy   Has the physician seen you for this condition before? No   Preferred Specialist (if applicable)? Do you already have an appointment scheduled? No  Additional Information for Provider?   ---------------------------------------------------------------------------  --------------  CALL BACK INFO  What is the best way for the office to contact you? OK to leave message on   voicemail  Preferred Call Back Phone Number?  [phone]

## 2020-09-10 ENCOUNTER — TELEPHONE (OUTPATIENT)
Dept: FAMILY MEDICINE CLINIC | Age: 40
End: 2020-09-10

## 2020-09-10 NOTE — TELEPHONE ENCOUNTER
----- Message from Yohan King sent at 9/10/2020  3:12 PM EDT -----  Subject: Message to Provider    QUESTIONS  Information for Provider? referral for physical therapy needs a new one   sent out. the original one is now . # for therapy [id number] needs   to contact then with update order number/date to renew. past the 90   acceptance date let patient know when it is accepted thanks  ---------------------------------------------------------------------------  --------------  [address]  What is the best way for the office to contact you? OK to leave message on   voicemail  Preferred Call Back Phone Number? [phone]  ---------------------------------------------------------------------------  --------------  SCRIPT ANSWERS  Relationship to Patient?  Self

## 2020-09-17 RX ORDER — METHYLPHENIDATE HYDROCHLORIDE 20 MG/1
20 TABLET ORAL 2 TIMES DAILY
Qty: 60 TABLET | Refills: 0 | Status: SHIPPED | OUTPATIENT
Start: 2020-09-17 | End: 2020-10-20 | Stop reason: SDUPTHER

## 2020-09-17 NOTE — TELEPHONE ENCOUNTER
Medication:   Requested Prescriptions     Pending Prescriptions Disp Refills    methylphenidate (RITALIN) 20 MG tablet 60 tablet 0     Sig: Take 1 tablet by mouth 2 times daily for 30 days. Last Filled: 08/24/2020    Patient Phone Number: [phone] (home)     Last appt: 12/3/2019   Next appt: Visit date not found    Last OARRS: No flowsheet data found.   PDMP Monitoring:    Last PDMP Mauri Gonzalez as Reviewed [facility]):  Review User Review Instant Review Result   Giulia Mcdonough 12/3/2019 12:25 PM Reviewed PDMP [1]     Preferred Pharmacy:   [address], [address] [place] [address] [address] [place] [address]  Elizabeth Solis [phone]  Phone: [phone] Fax: [phone]

## 2020-10-19 NOTE — TELEPHONE ENCOUNTER
Medication:   Requested Prescriptions     Pending Prescriptions Disp Refills    methylphenidate (RITALIN) 20 MG tablet 60 tablet 0     Sig: Take 1 tablet by mouth 2 times daily for 30 days. Last Filled:  9/17/20    Patient Phone Number: [phone] (home)     Last appt: 12/3/2019   Next appt: Visit date not found    Last OARRS: No flowsheet data found.   PDMP Monitoring:    Last PDMP Crispin Eli as Reviewed [facility]):  Review User Review Instant Review Result   Peace Baumann 12/3/2019 12:25 PM Reviewed PDMP [1]     Preferred Pharmacy:   [address], [address] [place] [address] [address] [place] [address]  Elizabeth Solis [phone]  Phone: [phone] Fax: [phone]

## 2020-10-19 NOTE — TELEPHONE ENCOUNTER
[address] [facility] George Lindsay [phone]         methylphenidate (RITALIN) 20 MG tablet  60 tablet  0  9/17/2020  10/17/2020     Sig - Route:  Take 1 tablet by mouth 2 times daily for 30 days. - Oral

## 2020-10-20 RX ORDER — METHYLPHENIDATE HYDROCHLORIDE 20 MG/1
20 TABLET ORAL 2 TIMES DAILY
Qty: 60 TABLET | Refills: 0 | Status: SHIPPED | OUTPATIENT
Start: 2020-10-20 | End: 2020-11-16 | Stop reason: SDUPTHER

## 2020-10-23 ENCOUNTER — HOSPITAL ENCOUNTER (OUTPATIENT)
Dept: PHYSICAL THERAPY | Age: 40
Setting detail: THERAPIES SERIES
Discharge: HOME | End: 2020-10-23
Payer: MEDICARE

## 2020-10-23 PROCEDURE — 97750 PHYSICAL PERFORMANCE TEST: CPT

## 2020-10-23 NOTE — PROGRESS NOTES
Outpatient Physical Therapy   Phone: [phone] Fax: [phone]    Functional Capacity Evaluation  Date: 10/23/2020        Patient Name:  Anabella Unger    :  1980  MRN: [id number]    Outcome Measures:           Optimal   Score 24  = 1% disability          Therapy Time   Individual Concurrent Group Co-treatment   Time In 1232         Time Out 1515         Minutes 163         Timed Code Treatment Minutes: 981 Minutes       Functional Capacity Evaluation completed this date. Results and full report will be available in [facility] under Media when complete.     Electronically signed by:  Vanesa Hansen, Andrew Kwok

## 2020-11-12 DIAGNOSIS — Z13.1 SCREENING FOR DIABETES MELLITUS: ICD-10-CM

## 2020-11-12 DIAGNOSIS — Z13.220 ENCOUNTER FOR LIPID SCREENING FOR CARDIOVASCULAR DISEASE: ICD-10-CM

## 2020-11-12 DIAGNOSIS — Z11.4 SCREENING FOR HIV (HUMAN IMMUNODEFICIENCY VIRUS): ICD-10-CM

## 2020-11-12 DIAGNOSIS — Z13.6 ENCOUNTER FOR LIPID SCREENING FOR CARDIOVASCULAR DISEASE: ICD-10-CM

## 2020-11-12 LAB
CHOLESTEROL, TOTAL: 201 MG/DL (ref 0–199)
GLUCOSE BLD-MCNC: 90 MG/DL (ref 70–99)
HDLC SERPL-MCNC: 46 MG/DL (ref 40–60)
LDL CHOLESTEROL CALCULATED: 104 MG/DL
TRIGL SERPL-MCNC: 255 MG/DL (ref 0–150)
VLDLC SERPL CALC-MCNC: 51 MG/DL

## 2020-11-13 LAB
HIV AG/AB: NORMAL
HIV ANTIGEN: NORMAL
HIV-1 ANTIBODY: NORMAL
HIV-2 AB: NORMAL

## 2020-11-16 RX ORDER — METHYLPHENIDATE HYDROCHLORIDE 20 MG/1
20 TABLET ORAL 2 TIMES DAILY
Qty: 60 TABLET | Refills: 0 | Status: SHIPPED | OUTPATIENT
Start: 2020-11-16 | End: 2020-12-15 | Stop reason: SDUPTHER

## 2020-11-16 NOTE — TELEPHONE ENCOUNTER
Medication:   Requested Prescriptions     Pending Prescriptions Disp Refills    methylphenidate (RITALIN) 20 MG tablet 60 tablet 0     Sig: Take 1 tablet by mouth 2 times daily for 30 days. Last Filled:  10/20/2020    Patient Phone Number: [phone] (home)     Last appt: 12/3/2019   Next appt: 12/3/2020    Last OARRS: No flowsheet data found.   PDMP Monitoring:    Last PDMP Celio Owusu as Reviewed [facility]):  Review User Review Instant Review Result   Lorin Jack 12/3/2019 12:25 PM Reviewed PDMP [1]     Preferred Pharmacy:   [address], [address] [place] [address] 0664 [facility]  Elizabeth Solis [phone]  Phone: [phone] Fax: [phone]

## 2020-12-03 ENCOUNTER — OFFICE VISIT (OUTPATIENT)
Dept: FAMILY MEDICINE CLINIC | Age: 40
End: 2020-12-03
Payer: MEDICARE

## 2020-12-03 VITALS
BODY MASS INDEX: 25.4 KG/M2 | TEMPERATURE: 97.8 F | HEART RATE: 78 BPM | OXYGEN SATURATION: 99 % | SYSTOLIC BLOOD PRESSURE: 118 MMHG | WEIGHT: 186 LBS | DIASTOLIC BLOOD PRESSURE: 78 MMHG

## 2020-12-03 PROCEDURE — G0008 ADMIN INFLUENZA VIRUS VAC: HCPCS | Performed by: FAMILY MEDICINE

## 2020-12-03 PROCEDURE — 4004F PT TOBACCO SCREEN RCVD TLK: CPT | Performed by: FAMILY MEDICINE

## 2020-12-03 PROCEDURE — G8482 FLU IMMUNIZE ORDER/ADMIN: HCPCS | Performed by: FAMILY MEDICINE

## 2020-12-03 PROCEDURE — 99214 OFFICE O/P EST MOD 30 MIN: CPT | Performed by: FAMILY MEDICINE

## 2020-12-03 PROCEDURE — G8427 DOCREV CUR MEDS BY ELIG CLIN: HCPCS | Performed by: FAMILY MEDICINE

## 2020-12-03 PROCEDURE — G8419 CALC BMI OUT NRM PARAM NOF/U: HCPCS | Performed by: FAMILY MEDICINE

## 2020-12-03 PROCEDURE — 90686 IIV4 VACC NO PRSV 0.5 ML IM: CPT | Performed by: FAMILY MEDICINE

## 2020-12-03 RX ORDER — FLUOXETINE HYDROCHLORIDE 20 MG/1
20 CAPSULE ORAL DAILY
Qty: 90 CAPSULE | Refills: 3 | Status: SHIPPED | OUTPATIENT
Start: 2020-12-03 | End: 2021-11-18

## 2020-12-03 RX ORDER — FLUTICASONE PROPIONATE 50 MCG
2 SPRAY, SUSPENSION (ML) NASAL DAILY
Qty: 3 BOTTLE | Refills: 5 | Status: SHIPPED | OUTPATIENT
Start: 2020-12-03 | End: 2021-12-09 | Stop reason: SDUPTHER

## 2020-12-03 ASSESSMENT — PATIENT HEALTH QUESTIONNAIRE - PHQ9
SUM OF ALL RESPONSES TO PHQ QUESTIONS 1-9: 1
2. FEELING DOWN, DEPRESSED OR HOPELESS: 1
SUM OF ALL RESPONSES TO PHQ QUESTIONS 1-9: 1
SUM OF ALL RESPONSES TO PHQ9 QUESTIONS 1 & 2: 1
1. LITTLE INTEREST OR PLEASURE IN DOING THINGS: 0
SUM OF ALL RESPONSES TO PHQ QUESTIONS 1-9: 1

## 2020-12-03 NOTE — PATIENT INSTRUCTIONS
ENT Specialists:    [facility] ([facility]) ENT    Dr. Alex Zimmerman  [phone]  [address] [facility] [address]    Dr. Trish Clemente  [address] [facility], [address] [facility]          [address]. Cinthya [address]enegro  [address] [facility]   [phone]    Dilcia Hua, 19 MD [facility]  [phone] [facility])  [phone] ([facility] 2)    [address] [facility])  [address] [facility] 65 22  [facility], [address]  Phone: [phone]        Podiatrists      Dr. Shelby Farfan  [address] [facility] , [address]  [phone]    Center for [facility]  Dr. Kita Jain  [address] beds at the [facility] [address] [facility] [address]  [phone]      [facility] 34 [facility]  Dr. Nati [facility]varez[facility] Cent[facility]. [address]   [phone]        Patient Education        Skin Cancer Prevention: Care Instructions  Your Care Instructions     Skin cancer is the abnormal growth of cells in the skin. It usually appears as a growth that changes in color, shape, or size. This can be a sore that does not heal or a change in a wart or a mole. Skin cancer is almost always curable when found early and treated. So it is important to see your doctor if you have any of these changes in your skin. Skin cancer is the most common type of cancer. It often appears on areas of the body that have been exposed to the sun, such as the head, face, neck, back, chest, or shoulders. Follow-up care is a key part of your treatment and safety. Be sure to make and go to all appointments, and call your doctor if you are having problems. It's also a good idea to know your test results and keep a list of the medicines you take. How can you care for yourself at home?   · Wear a wide-brimmed hat and long sleeves and

## 2020-12-03 NOTE — PROGRESS NOTES
Vaccine Information Sheet, \"Influenza - Inactivated\"  given to Lucio Anderson, or parent/legal guardian of  Lucio Anderson and verbalized understanding. Patient responses:    Have you ever had a reaction to a flu vaccine? No  Do you have any current illness? No  Have you ever had Guillian [place] Syndrome? No  Do you have a serious allergy to any of the follow: Neomycin, Polymyxin, Thimerosal, eggs or egg products? No    Flu vaccine given per order. Please see immunization tab. Risks and benefits explained. Current VIS given.       Immunizations Administered     Name Date Dose Route    Influenza, Quadv, IM, PF (6 mo and older Fluzone, Flulaval, Fluarix, and 3 yrs and older Afluria) 12/3/2020 0.5 mL Intramuscular    Site: Deltoid- Right    Lot: [id number]    NDC: 74315-193-97

## 2020-12-03 NOTE — PROGRESS NOTES
Here for annual f/U chronic conditions. OCD-  feels like      Living on own in [address], works as . Still having numbness on ulnar side of right hand and pinkie finger. Nothing in arm. He thinks caused by sleeping on hand with arm bent, trying not to do that but hasn't noted improvement yet.  feels like hand is little weak due to it, hard to write. Righ hand dominant. States has lot of nasal congestion all the time. Takes flonase and helps some. Uses air filter in condo. Take ziacam. Takes OTC allergy pill as well. Doing well on ADD meds. Taking reg no SE. Works well to help with focus and concentration. Stopped the finesteride as didn't feel made a difference in his baldness. Dental: due  Eye: up-to-date    Exercise: yes  Diet: good    HM reviewed with pt    Patient's medications, allergies, past medical, surgical, social and family histories were reviewed and updated in the EHR as appropriate. Body mass index is 25.4 kg/m². Vitals:    12/03/20 1134   BP: 118/78   Site: Left Upper Arm   Position: Sitting   Cuff Size: Medium Adult   Pulse: 78   Temp: 97.8 °F (36.6 °C)   TempSrc: Tympanic   SpO2: 99%   Weight: 186 lb (84.4 kg)     Wt Readings from Last 3 Encounters:   12/03/20 186 lb (84.4 kg)   12/03/19 187 lb (84.8 kg)   11/29/18 185 lb (83.9 kg)     PHQ score: PHQ-9 Total Score: 1 (12/3/2020 11:43 AM)      GENERAL:Alert and oriented x 4 NAD, affect appropriate and overweight, well hydrated, well developed.   NECK:supple and non tender without mass, no thyromegaly or thyroid nodules, no cervical lymphadenopathy  LUNG:clear to auscultation bilaterally with normal respiratory effort  CV: Normal heart sounds, regular rate and rhythm without murmurs  EXTREMETY: no loss of hair, no edema, normal pedal pulses bilaterally  Skin of hypothenar eminense shiny and smooth, slight atrophy   and interosseous 5-/5 on right, just slightly weaker than left        ASSESSMENT AND PLAN:       Larisa Munoz was seen today for annual exam.    Diagnoses and all orders for this visit:    Traumatic brain injury, without loss of consciousness, sequela ([facility] [address]  Applying for additional disability.  -Stable, continue current medications. Obsessive-compulsive personality disorder ([facility] [address]  -Stable, continue current medications. Attention deficit hyperactivity disorder (ADHD), combined type  -Stable, continue current medications. Male pattern baldness  Stopped meds due to ineffective    Tobacco dependence  Strongly encouraged to quit, pt not interested at this time    Ulnar neuropathy at wrist, right  -     EMG; Future  Get EMG, stressed has some weakness so very important he gets this done    Allergic rhinitis, unspecified seasonality, unspecified trigger  Take flonase reg  F/u with ENT if not helping, names given    Need for vaccination  -     INFLUENZA, QUADV, 3 YRS AND OLDER, IM PF, PREFILL SYR OR SDV, 0.5ML (AFLURIA QUADV, PF)    Other orders  -     FLUoxetine (PROZAC) 20 MG capsule; Take 1 capsule by mouth daily  -     fluticasone (FLONASE) 50 MCG/ACT nasal spray; 2 sprays by Each Nostril route daily         Reviewed recent labs with patient. Needs to make appt with derm, encouraged him to stop tanning        Return in about 1 year (around 12/3/2021) for 30 min, Follow up.              Note per SHORTY Simental and Scribe with corrections and edits per Sally Olmedo MD.  I agree with entirety of note and was present and performed history and physical.  I also confirm that the note above accurately reflects all work, treatment, procedures, and medical decision making performed by me, Sally Olmedo MD

## 2020-12-15 RX ORDER — METHYLPHENIDATE HYDROCHLORIDE 20 MG/1
20 TABLET ORAL 2 TIMES DAILY
Qty: 60 TABLET | Refills: 0 | Status: SHIPPED | OUTPATIENT
Start: 2020-12-15 | End: 2021-01-14 | Stop reason: SDUPTHER

## 2020-12-15 NOTE — TELEPHONE ENCOUNTER
Medication:   Requested Prescriptions     Pending Prescriptions Disp Refills    methylphenidate (RITALIN) 20 MG tablet 60 tablet 0     Sig: Take 1 tablet by mouth 2 times daily for 30 days. Last Filled:  11/16/2020    Patient Phone Number: [phone] (home)     Last appt: 12/3/2020   Next appt: Visit date not found    Last OARRS: No flowsheet data found.   PDMP Monitoring:    Last PDMP Tracey Adames as Reviewed [facility]):  Review User Review Instant Review Result   Femicarrie Parviz 12/3/2019 12:25 PM Reviewed PDMP [1]     Preferred Pharmacy:   [address], [address] [place] [address] 9696 [facility] 548 [phone]  Phone: [phone] Fax: [phone]

## 2021-01-13 DIAGNOSIS — F90.2 ATTENTION DEFICIT HYPERACTIVITY DISORDER (ADHD), COMBINED TYPE: ICD-10-CM

## 2021-01-14 RX ORDER — METHYLPHENIDATE HYDROCHLORIDE 20 MG/1
20 TABLET ORAL 2 TIMES DAILY
Qty: 60 TABLET | Refills: 0 | Status: SHIPPED | OUTPATIENT
Start: 2021-01-14 | End: 2021-02-11 | Stop reason: SDUPTHER

## 2021-02-11 DIAGNOSIS — F90.2 ATTENTION DEFICIT HYPERACTIVITY DISORDER (ADHD), COMBINED TYPE: ICD-10-CM

## 2021-02-11 RX ORDER — METHYLPHENIDATE HYDROCHLORIDE 20 MG/1
20 TABLET ORAL 2 TIMES DAILY
Qty: 60 TABLET | Refills: 0 | Status: SHIPPED | OUTPATIENT
Start: 2021-02-11 | End: 2021-03-12 | Stop reason: SDUPTHER

## 2021-02-11 NOTE — TELEPHONE ENCOUNTER
----- Message from Ladonna Griffith sent at 2/10/2021  5:18 PM EST -----  Subject: Refill Request    QUESTIONS  Name of Medication? methylphenidate (RITALIN) 20 MG tablet  Patient-reported dosage and instructions? 20 mg (60 quantity) twice a day  How many days do you have left? 4  Preferred Pharmacy? [facility] #[id number]  Pharmacy phone number (if available)? [phone]  ---------------------------------------------------------------------------  --------------  CALL BACK INFO  What is the best way for the office to contact you? OK to leave message on   voicemail  Preferred Call Back Phone Number?  [phone]

## 2021-02-11 NOTE — TELEPHONE ENCOUNTER
Medication:   Requested Prescriptions     Pending Prescriptions Disp Refills    methylphenidate (RITALIN) 20 MG tablet 60 tablet 0     Sig: Take 1 tablet by mouth 2 times daily for 30 days. Last Filled:  1/14/21    Patient Phone Number: [phone] (home)     Last appt: 12/3/2020   Next appt: Visit date not found    Last OARRS: No flowsheet data found.   PDMP Monitoring:    Last PDMP Eddie Arreola as Reviewed [facility]):  Review User Review Instant Review Result   Jin Talley 12/3/2019 12:25 PM Reviewed PDMP [1]     Preferred Pharmacy:   [address], [address] [place] [address] 0258 [facility] [phone]  Phone: [phone] Fax: [phone]

## 2021-03-12 DIAGNOSIS — F90.2 ATTENTION DEFICIT HYPERACTIVITY DISORDER (ADHD), COMBINED TYPE: ICD-10-CM

## 2021-03-12 RX ORDER — METHYLPHENIDATE HYDROCHLORIDE 20 MG/1
20 TABLET ORAL 2 TIMES DAILY
Qty: 60 TABLET | Refills: 0 | Status: SHIPPED | OUTPATIENT
Start: 2021-03-12 | End: 2021-04-09 | Stop reason: SDUPTHER

## 2021-03-12 NOTE — TELEPHONE ENCOUNTER
Medication:   Requested Prescriptions     Pending Prescriptions Disp Refills    methylphenidate (RITALIN) 20 MG tablet 60 tablet 0     Sig: Take 1 tablet by mouth 2 times daily for 30 days. Last Filled:  2/11/21    Patient Phone Number: [phone] (home)     Last appt: 12/3/2020   Next appt: Visit date not found    Last OARRS: No flowsheet data found.   PDMP Monitoring:    Last PDMP Magaly Villarreal as Reviewed [facility]):  Review User Review Instant Review Result   Gloria Finch 12/3/2019 12:25 PM Reviewed PDMP [1]     Preferred Pharmacy:   [address], [address] [place] [address] 8571 [facility]  [place] [phone]  Phone: [phone] Fax: [phone]

## 2021-03-12 NOTE — TELEPHONE ENCOUNTER
methylphenidate (RITALIN) 20 MG tablet 60 tablet 0 2/11/2021 3/13/2021    Sig - Route:  Take 1 tablet by mouth 2 times daily for 30 days. - Oral          [facility] in chart

## 2021-04-07 DIAGNOSIS — F90.2 ATTENTION DEFICIT HYPERACTIVITY DISORDER (ADHD), COMBINED TYPE: ICD-10-CM

## 2021-04-08 NOTE — TELEPHONE ENCOUNTER
Medication:   Requested Prescriptions     Pending Prescriptions Disp Refills    methylphenidate (RITALIN) 20 MG tablet 60 tablet 0     Sig: Take 1 tablet by mouth 2 times daily for 30 days. Last Filled:  3/12/21    Patient Phone Number: [phone] (home)     Last appt: 12/3/2020   Next appt: Visit date not found    Last OARRS: No flowsheet data found.   PDMP Monitoring:    Last PDMP Maryma Escuderos as Reviewed [facility]):  Review User Review Instant Review Result   Orquidea Vera 12/3/2019 12:25 PM Reviewed PDMP [1]     Preferred Pharmacy:   [address], [address] [place] [address] [address] [place] [address]  [place] [phone]  Phone: [phone] Fax: [phone]

## 2021-04-09 RX ORDER — METHYLPHENIDATE HYDROCHLORIDE 20 MG/1
20 TABLET ORAL 2 TIMES DAILY
Qty: 60 TABLET | Refills: 0 | Status: SHIPPED | OUTPATIENT
Start: 2021-04-09 | End: 2021-05-06 | Stop reason: SDUPTHER

## 2021-05-05 DIAGNOSIS — F90.2 ATTENTION DEFICIT HYPERACTIVITY DISORDER (ADHD), COMBINED TYPE: ICD-10-CM

## 2021-05-05 NOTE — TELEPHONE ENCOUNTER
Medication:   Requested Prescriptions     Pending Prescriptions Disp Refills    methylphenidate (RITALIN) 20 MG tablet 60 tablet 0     Sig: Take 1 tablet by mouth 2 times daily for 30 days. Last Filled:  4/9/21  Patient Phone Number: [phone] (home)     Last appt: 12/3/2020   Next appt: Visit date not found    Last OARRS: No flowsheet data found.   PDMP Monitoring:    Last PDMP Sunday Vela as Reviewed [facility]):  Review User Review Instant Review Result   Talisha Howell 12/3/2019 12:25 PM Reviewed PDMP [1]     Preferred Pharmacy:   [facility] [address], [address] [place] [address] [facility] [phone]  Phone: [phone] Fax: [phone]

## 2021-05-05 NOTE — TELEPHONE ENCOUNTER
Disp Refills Start End    methylphenidate (RITALIN) 20 MG tablet 60 tablet 0 4/9/2021 5/9/2021    Sig - Route:  Take 1 tablet by mouth 2 times daily for 30 days. - Oral      [place] [address] [address] [place] [address], [address] [facility] [phone]     Provider out of office      Please advise

## 2021-05-06 RX ORDER — METHYLPHENIDATE HYDROCHLORIDE 20 MG/1
20 TABLET ORAL 2 TIMES DAILY
Qty: 60 TABLET | Refills: 0 | Status: SHIPPED | OUTPATIENT
Start: 2021-05-06 | End: 2021-06-03 | Stop reason: SDUPTHER

## 2021-06-02 DIAGNOSIS — F90.2 ATTENTION DEFICIT HYPERACTIVITY DISORDER (ADHD), COMBINED TYPE: ICD-10-CM

## 2021-06-02 NOTE — TELEPHONE ENCOUNTER
methylphenidate (RITALIN) 20 MG tablet 60 tablet 0 5/6/2021 6/5/2021    Sig - Route:  Take 1 tablet by mouth 2 times daily for 30 days. - Oral        [facility] in chart

## 2021-06-03 RX ORDER — METHYLPHENIDATE HYDROCHLORIDE 20 MG/1
20 TABLET ORAL 2 TIMES DAILY
Qty: 60 TABLET | Refills: 0 | Status: SHIPPED | OUTPATIENT
Start: 2021-06-03 | End: 2021-07-01 | Stop reason: SDUPTHER

## 2021-06-03 NOTE — TELEPHONE ENCOUNTER
Medication:   Requested Prescriptions     Pending Prescriptions Disp Refills    methylphenidate (RITALIN) 20 MG tablet 60 tablet 0     Sig: Take 1 tablet by mouth 2 times daily for 30 days. Last Filled:  5/6/21    Patient Phone Number: [phone] (home)     Last appt: 12/3/2020   Next appt: Visit date not found    Last OARRS: No flowsheet data found.   PDMP Monitoring:    Last PDMP Kenny Anton as Reviewed [facility]):  Review User Review Instant Review Result   Luiz Soulier 12/3/2019 12:25 PM Reviewed PDMP [1]     Preferred Pharmacy:   [address], [address] [place] [address] 5940 [facility]  [place] [phone]  Phone: [phone] Fax: [phone]

## 2021-07-01 DIAGNOSIS — F90.2 ATTENTION DEFICIT HYPERACTIVITY DISORDER (ADHD), COMBINED TYPE: ICD-10-CM

## 2021-07-01 RX ORDER — METHYLPHENIDATE HYDROCHLORIDE 20 MG/1
20 TABLET ORAL 2 TIMES DAILY
Qty: 60 TABLET | Refills: 0 | Status: SHIPPED | OUTPATIENT
Start: 2021-07-01 | End: 2021-07-29 | Stop reason: SDUPTHER

## 2021-07-01 NOTE — TELEPHONE ENCOUNTER
----- Message from Conrado Denise sent at 7/1/2021  1:54 PM EDT -----  Subject: Refill Request    QUESTIONS  Name of Medication? methylphenidate (RITALIN) 20 MG tablet  Patient-reported dosage and instructions? 20mg twice a day  How many days do you have left? 4  Preferred Pharmacy? [facility] #[id number]  Pharmacy phone number (if available)? [phone]  Additional Information for Provider? pt. needs refill quantity 60 pills   takes one twice a day  ---------------------------------------------------------------------------  --------------  CALL BACK INFO  What is the best way for the office to contact you? OK to leave message on   voicemail  Preferred Call Back Phone Number?  [phone]

## 2021-07-01 NOTE — TELEPHONE ENCOUNTER
Medication:   Requested Prescriptions     Pending Prescriptions Disp Refills    methylphenidate (RITALIN) 20 MG tablet 60 tablet 0     Sig: Take 1 tablet by mouth 2 times daily for 30 days. Last Filled:  6/3/21    Patient Phone Number: [phone] (home)     Last appt: 12/3/2020   Next appt: 12/9/2021    Last OARRS: No flowsheet data found.   PDMP Monitoring:    Last PDMP Clarence Furnace as Reviewed [facility]):  Review User Review Instant Review Result   Josr Lipscomb 12/3/2019 12:25 PM Reviewed PDMP [1]     Preferred Pharmacy:   [facility] [address] [place] [address], [address] [place] [address] [address] [place] [address]  [place] 405 [phone]  Phone: [phone] Fax: [phone]

## 2021-07-29 DIAGNOSIS — F90.2 ATTENTION DEFICIT HYPERACTIVITY DISORDER (ADHD), COMBINED TYPE: ICD-10-CM

## 2021-07-29 RX ORDER — METHYLPHENIDATE HYDROCHLORIDE 20 MG/1
20 TABLET ORAL 2 TIMES DAILY
Qty: 60 TABLET | Refills: 0 | Status: SHIPPED | OUTPATIENT
Start: 2021-07-31 | End: 2021-08-26 | Stop reason: SDUPTHER

## 2021-07-29 NOTE — TELEPHONE ENCOUNTER
Medication:   Requested Prescriptions      No prescriptions requested or ordered in this encounter      Last Filled:  7/1/21- Provider out of office    Patient Phone Number: [phone] (home)     Last appt: 12/3/2020   Next appt: 12/9/2021    Last OARRS: No flowsheet data found.   PDMP Monitoring:    Last PDMP Fernando Dietz as Reviewed [facility]):  Review User Review Instant Review Result   Lauralily Fuentes 12/3/2019 12:25 PM Reviewed PDMP [1]     Preferred Pharmacy:   [address], [address] [place] [address] [address] [place] [address]  Elizabeth [place]is [phone]  Phone: [phone] Fax: [phone]

## 2021-07-29 NOTE — TELEPHONE ENCOUNTER
----- Message from Brittany Rendon sent at 7/28/2021  3:01 PM EDT -----  Subject: Refill Request    QUESTIONS  Name of Medication? methylphenidate (RITALIN) 20 MG tablet  Patient-reported dosage and instructions? Take 1 tablet by mouth 2 times   daily for 30 days. How many days do you have left? 8  [facility]acy? [id number]  Pharmacy phone number (if available)? [phone]  Additional Information for Provider? pt is calling in early to give heads   up to refill medication.   ---------------------------------------------------------------------------  --------------  CALL BACK INFO  What is the best way for the office to contact you? OK to leave message on   voicemail  Preferred Call Back Phone Number?  [phone]

## 2021-08-26 DIAGNOSIS — F90.2 ATTENTION DEFICIT HYPERACTIVITY DISORDER (ADHD), COMBINED TYPE: ICD-10-CM

## 2021-08-26 RX ORDER — METHYLPHENIDATE HYDROCHLORIDE 20 MG/1
20 TABLET ORAL 2 TIMES DAILY
Qty: 60 TABLET | Refills: 0 | Status: SHIPPED | OUTPATIENT
Start: 2021-08-26 | End: 2021-09-21 | Stop reason: SDUPTHER

## 2021-08-26 NOTE — TELEPHONE ENCOUNTER
----- Message from Inés Parks sent at 8/25/2021  3:52 PM EDT -----  Subject: Refill Request    QUESTIONS  Name of Medication? methylphenidate (RITALIN) 20 MG tablet  Patient-reported dosage and instructions? 20mg twice daily  How many days do you have left? Unknown  Preferred Pharmacy? [facility] #[id number]  Pharmacy phone number (if available)? [phone]  Additional Information for Provider? Pt would like 60 tab refill ritalin  ---------------------------------------------------------------------------  --------------  CALL BACK INFO  What is the best way for the office to contact you? OK to leave message on   voicemail  Preferred Call Back Phone Number?  [phone]

## 2021-09-21 DIAGNOSIS — F90.2 ATTENTION DEFICIT HYPERACTIVITY DISORDER (ADHD), COMBINED TYPE: ICD-10-CM

## 2021-09-21 RX ORDER — METHYLPHENIDATE HYDROCHLORIDE 20 MG/1
20 TABLET ORAL 2 TIMES DAILY
Qty: 60 TABLET | Refills: 0 | Status: SHIPPED | OUTPATIENT
Start: 2021-09-21 | End: 2021-10-20 | Stop reason: SDUPTHER

## 2021-09-21 NOTE — TELEPHONE ENCOUNTER
Medication:   Requested Prescriptions     Pending Prescriptions Disp Refills    methylphenidate (RITALIN) 20 MG tablet 60 tablet 0     Sig: Take 1 tablet by mouth 2 times daily for 30 days. Last Filled:  8/26/21    Patient Phone Number: [phone] (home)     Last appt: 12/3/2020   Next appt: 12/9/2021    Last OARRS: No flowsheet data found.   PDMP Monitoring:    Last PDMP Aston Neighbours as Reviewed [facility]):  Review User Review Instant Review Result   Carlos HUNTLEY 7/29/2021  8:11 AM Reviewed PDMP [1]     Preferred Pharmacy:   [facility] [address], [address] [facility] [address] [place] 150 [phone]  Phone: [phone] Fax: [phone]

## 2021-09-21 NOTE — TELEPHONE ENCOUNTER
----- Message from [facility] sent at 9/21/2021  3:51 PM EDT -----  Subject: Refill Request    QUESTIONS  Name of Medication? methylphenidate (RITALIN) 20 MG tablet  Patient-reported dosage and instructions? n/a  How many days do you have left? 0  Preferred Pharmacy? [facility] #[id number]  Pharmacy phone number (if available)? [phone]  ---------------------------------------------------------------------------  --------------  CALL BACK INFO  What is the best way for the office to contact you? OK to leave message on   voicemail  Preferred Call Back Phone Number?  [phone]

## 2021-10-19 DIAGNOSIS — F90.2 ATTENTION DEFICIT HYPERACTIVITY DISORDER (ADHD), COMBINED TYPE: ICD-10-CM

## 2021-10-19 NOTE — TELEPHONE ENCOUNTER
Medication:   Requested Prescriptions     Pending Prescriptions Disp Refills    methylphenidate (RITALIN) 20 MG tablet 60 tablet 0     Sig: Take 1 tablet by mouth 2 times daily for 30 days. Last Filled:  9/21/21    Patient Phone Number: [phone] (home)     Last appt: 12/3/2020   Next appt: 12/9/2021    Last OARRS: No flowsheet data found.   PDMP Monitoring:    Last PDMP Juan Krueger as Reviewed [facility]):  Review User Review Instant Review Result   Tsering HUNTLEY 7/29/2021  8:11 AM Reviewed PDMP [1]     Preferred Pharmacy:   [address], [address] [facility] [address] [place] [phone]  Phone: [phone] Fax: [phone]

## 2021-10-19 NOTE — TELEPHONE ENCOUNTER
----- Message from Shiloh Woodard sent at 10/19/2021  3:19 PM EDT -----  Subject: Refill Request    QUESTIONS  Name of Medication? methylphenidate (RITALIN) 20 MG tablet  Patient-reported dosage and instructions? 20 MG tablet - twice per day  How many days do you have left? 2  Preferred Pharmacy? [facility] #[id number]  Pharmacy phone number (if available)? [phone]  Additional Information for Provider? Need 60 total  ---------------------------------------------------------------------------  --------------  CALL BACK INFO  What is the best way for the office to contact you? OK to leave message on   voicemail  Preferred Call Back Phone Number?  [phone]

## 2021-10-20 RX ORDER — METHYLPHENIDATE HYDROCHLORIDE 20 MG/1
20 TABLET ORAL 2 TIMES DAILY
Qty: 60 TABLET | Refills: 0 | Status: SHIPPED | OUTPATIENT
Start: 2021-10-20 | End: 2021-11-18 | Stop reason: SDUPTHER

## 2021-11-16 DIAGNOSIS — F90.2 ATTENTION DEFICIT HYPERACTIVITY DISORDER (ADHD), COMBINED TYPE: ICD-10-CM

## 2021-11-16 NOTE — TELEPHONE ENCOUNTER
Medication:   Requested Prescriptions     Pending Prescriptions Disp Refills    methylphenidate (RITALIN) 20 MG tablet 60 tablet 0     Sig: Take 1 tablet by mouth 2 times daily for 30 days. Last Filled:  10/20/21    Patient Phone Number: [phone] (home)     Last appt: 12/3/2020   Next appt: 12/9/2021    Last OARRS: No flowsheet data found.   PDMP Monitoring:    Last PDMP Luis smiley Reviewed [facility]):  Review User Review Instant Review Result   Joon HUNTLEY 7/29/2021  8:11 AM Reviewed PDMP [1]     Preferred Pharmacy:   [address], [address] [facility] [address] [place] [phone]  Phone: [phone] Fax: [phone]

## 2021-11-16 NOTE — TELEPHONE ENCOUNTER
----- Message from [employer] sent at 11/16/2021  2:33 PM EST -----  Subject: Refill Request    QUESTIONS  Name of Medication? methylphenidate (RITALIN) 20 MG tablet  Patient-reported dosage and instructions? 20 MG Tablet twice day daily  How many days do you have left? 0  Preferred Pharmacy? [facility] #[id number]  Pharmacy phone number (if available)? [phone]  Additional Information for Provider? [facility] received call from krupa Stewart who is requesting refill.  ---------------------------------------------------------------------------  --------------  CALL BACK INFO  What is the best way for the office to contact you? OK to leave message on   voicemail  Preferred Call Back Phone Number?  [phone]

## 2021-11-18 RX ORDER — METHYLPHENIDATE HYDROCHLORIDE 20 MG/1
20 TABLET ORAL 2 TIMES DAILY
Qty: 60 TABLET | Refills: 0 | Status: SHIPPED | OUTPATIENT
Start: 2021-11-18 | End: 2021-12-09 | Stop reason: SDUPTHER

## 2021-11-18 RX ORDER — FLUOXETINE HYDROCHLORIDE 20 MG/1
20 CAPSULE ORAL DAILY
Qty: 90 CAPSULE | Refills: 3 | Status: SHIPPED | OUTPATIENT
Start: 2021-11-18 | End: 2021-12-09 | Stop reason: SDUPTHER

## 2021-11-18 NOTE — TELEPHONE ENCOUNTER
Medication:   Requested Prescriptions     Pending Prescriptions Disp Refills    FLUoxetine (PROZAC) 20 MG capsule [Pharmacy Med Name: FLUOXETINE 20MG CAPSULES] 90 capsule 3     Sig: TAKE 1 CAPSULE BY MOUTH DAILY          Patient Phone Number: [phone] (home)     Last appt: 12/3/2020   Next appt: 12/9/2021    Last OARRS: No flowsheet data found.   PDMP Monitoring:    Last PDMP Avni Martinez as Reviewed [facility]):  Review User Review Instant Review Result   Bela HUNTLEY 7/29/2021  8:11 AM Reviewed PDMP [1]     Preferred Pharmacy:   [address], [address] [facility] [address] [place] 219 [address]  Phone: [phone] Fax: [phone]

## 2021-12-09 ENCOUNTER — OFFICE VISIT (OUTPATIENT)
Dept: FAMILY MEDICINE CLINIC | Age: 41
End: 2021-12-09
Payer: MEDICARE

## 2021-12-09 VITALS
WEIGHT: 188.4 LBS | DIASTOLIC BLOOD PRESSURE: 88 MMHG | BODY MASS INDEX: 25.73 KG/M2 | HEART RATE: 77 BPM | SYSTOLIC BLOOD PRESSURE: 120 MMHG | TEMPERATURE: 97.4 F | OXYGEN SATURATION: 99 %

## 2021-12-09 DIAGNOSIS — F60.5 OBSESSIVE-COMPULSIVE PERSONALITY DISORDER (HCC): Primary | ICD-10-CM

## 2021-12-09 DIAGNOSIS — F90.2 ATTENTION DEFICIT HYPERACTIVITY DISORDER (ADHD), COMBINED TYPE: ICD-10-CM

## 2021-12-09 DIAGNOSIS — S06.9X0S TRAUMATIC BRAIN INJURY, WITHOUT LOSS OF CONSCIOUSNESS, SEQUELA (HCC): ICD-10-CM

## 2021-12-09 DIAGNOSIS — F17.200 TOBACCO DEPENDENCE: ICD-10-CM

## 2021-12-09 PROCEDURE — 99214 OFFICE O/P EST MOD 30 MIN: CPT | Performed by: FAMILY MEDICINE

## 2021-12-09 PROCEDURE — G8419 CALC BMI OUT NRM PARAM NOF/U: HCPCS | Performed by: FAMILY MEDICINE

## 2021-12-09 PROCEDURE — G8484 FLU IMMUNIZE NO ADMIN: HCPCS | Performed by: FAMILY MEDICINE

## 2021-12-09 PROCEDURE — G8427 DOCREV CUR MEDS BY ELIG CLIN: HCPCS | Performed by: FAMILY MEDICINE

## 2021-12-09 PROCEDURE — 4004F PT TOBACCO SCREEN RCVD TLK: CPT | Performed by: FAMILY MEDICINE

## 2021-12-09 RX ORDER — METHYLPHENIDATE HYDROCHLORIDE 20 MG/1
20 TABLET ORAL 2 TIMES DAILY
Qty: 60 TABLET | Refills: 0 | Status: SHIPPED | OUTPATIENT
Start: 2021-12-09 | End: 2022-01-13 | Stop reason: SDUPTHER

## 2021-12-09 RX ORDER — FLUOXETINE HYDROCHLORIDE 20 MG/1
20 CAPSULE ORAL DAILY
Qty: 90 CAPSULE | Refills: 3 | Status: SHIPPED | OUTPATIENT
Start: 2021-12-09

## 2021-12-09 RX ORDER — FLUTICASONE PROPIONATE 50 MCG
2 SPRAY, SUSPENSION (ML) NASAL DAILY
Qty: 3 EACH | Refills: 3 | Status: SHIPPED | OUTPATIENT
Start: 2021-12-09

## 2021-12-09 NOTE — TELEPHONE ENCOUNTER
Medication:   Requested Prescriptions     Pending Prescriptions Disp Refills    FLUoxetine (PROZAC) 20 MG capsule 90 capsule 3     Sig: Take 1 capsule by mouth daily       Patient Phone Number: [phone] (home)     Last appt: 12/9/2021   Next appt: Visit date not found    Last OARRS: No flowsheet data found.   PDMP Monitoring:    Last PDMP Park Mini as Reviewed [facility]):  Review User Review Instant Review Result   Helen HUNTLEY 7/29/2021  8:11 AM Reviewed PDMP [1]     Preferred Pharmacy:   [address], [address]  [address] [facility] [address] [place] [phone]  Phone: [phone] Fax: [phone]

## 2021-12-09 NOTE — TELEPHONE ENCOUNTER
----- Message from [facility] sent at 12/9/2021  3:44 PM EST -----  Subject: Refill Request    QUESTIONS  Name of Medication? FLUoxetine (PROZAC) 20 MG capsule  Patient-reported dosage and instructions? 20MG 1X daily  How many days do you have left? 0  Preferred Pharmacy? [facility] #[id number]  Pharmacy phone number (if available)? [phone]  Additional Information for Provider? Patient advised he needs a one year   refill prescription for this medication. Patient advised he spoke with Dr Gee Duarte 12/9 at his appointment reference this matter. ---------------------------------------------------------------------------  --------------  Yenifer GALDAMEZ  What is the best way for the office to contact you? OK to leave message on   voicemail  Preferred Call Back Phone Number?  [phone]
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
## 2021-12-09 NOTE — PATIENT INSTRUCTIONS
Send me a copy of your covid vaccinations. Stopping Smokeless Tobacco Use: Care Instructions  Your Care Instructions     Smokeless tobacco comes in many forms, such as snuff and chewing tobacco:  · Snuff is finely ground tobacco sold in cans or pouches. Most of the time, snuff is used by putting a \"pinch\" or \"dip\" between the lower lip or cheek and the gum. · Chewing tobacco is sold as loose leaves, plugs, or twists. It is chewed or placed between the cheek and the gum or teeth. There are plenty of reasons to stop using smokeless tobacco. These products are harmful. They are not risk-free alternatives to smoking. Smokeless tobacco contains nicotine, which is addicting. Though using smokeless tobacco is less harmful than smoking cigarettes, it can cause serious health problems, such as:  · White patches or red sores in your mouth that can turn into mouth cancer involving the lip, tongue, or cheek. · Tooth loss and other dental problems. · Gum disease. Your gums may pull away from your teeth and not grow back. People who use smokeless tobacco crave the nicotine in it. Giving up smokeless tobacco is much harder than simply changing a habit. Your body has to stop craving the nicotine. It is hard to quit, but you can do it. Many tools are available for people who want to quit using smokeless tobacco. You may find that combining tools works best for you. There are several steps to quitting. First you get ready to quit. Then you get support to help you. After that, you learn new skills and behaviors to quit. For many people, a necessary step is getting and using medicine. Your doctor will help you set up the plan that best meets your needs. You may want to attend a tobacco cessation program. When you choose a program, look for one that has proven success. Ask your doctor for ideas.  You will greatly increase your chances of success if you take medicine as well as get counseling or join a cessation activity you enjoy, such as reading a book, taking a hot bath, or gardening. · Talk to your doctor or pharmacist about nicotine replacement therapy. You still get nicotine, but you do not use tobacco. Nicotine replacement products help you slowly reduce the amount of nicotine you need. Many of these products are available over the counter. They include nicotine patches, gum, lozenges, and inhalers. · Ask your doctor about bupropion (Wellbutrin) or varenicline (Chantix), which are prescription medicines. They do not contain nicotine. They help you by reducing withdrawal symptoms, such as stress and anxiety. · Get regular exercise. Having healthy habits will help your body move past its craving for nicotine. · Be prepared to keep trying. Most people are not successful the first few times they try to quit. Do not get mad at yourself if you use tobacco again. Make a list of things you learned, and think about when you want to try again, such as next week, next month, or next year. Where can you learn more? Go to https://[employer]. org and sign in to your m-spatial account. Enter T931 in the [facility] box to learn more about \"Stopping Smokeless Tobacco Use: Care Instructions. \"     If you do not have an account, please click on the \"Sign Up Now\" link. Current as of: February 11, 2021               Content Version: 13.0  © 9287-6119 Healthwise, Incorporated. Care instructions adapted under license by [facility] ([facility]). If you have questions about a medical condition or this instruction, always ask your healthcare professional. [place] [address] any warranty or liability for your use of this information.

## 2022-01-13 DIAGNOSIS — F90.2 ATTENTION DEFICIT HYPERACTIVITY DISORDER (ADHD), COMBINED TYPE: ICD-10-CM

## 2022-01-13 RX ORDER — METHYLPHENIDATE HYDROCHLORIDE 20 MG/1
20 TABLET ORAL 2 TIMES DAILY
Qty: 60 TABLET | Refills: 0 | Status: SHIPPED | OUTPATIENT
Start: 2022-01-13 | End: 2022-02-10 | Stop reason: SDUPTHER

## 2022-01-13 NOTE — TELEPHONE ENCOUNTER
Medication:   Requested Prescriptions     Pending Prescriptions Disp Refills    methylphenidate (RITALIN) 20 MG tablet 60 tablet 0     Sig: Take 1 tablet by mouth 2 times daily for 30 days. Last Filled:  12/9/21    Patient Phone Number: [phone] (home)     Last appt: 12/9/2021   Next appt: Visit date not found    Last OARRS:   RX Monitoring 12/13/2021   Periodic Controlled Substance Monitoring No signs of potential drug abuse or diversion identified.      PDMP Monitoring:    Last PDMP [place] as Reviewed [facility]):  Review User Review Instant Review Result   Yves Brodys 12/13/2021  2:50 PM Reviewed PDMP [1]     Preferred Pharmacy:   [address], [address] [facility] [address] [address]  Phone: [phone] Fax: [phone]

## 2022-01-13 NOTE — TELEPHONE ENCOUNTER
----- Message from Brenda Schmid sent at 1/12/2022  3:02 PM EST -----  Subject: Refill Request    QUESTIONS  Name of Medication? methylphenidate (RITALIN) 20 MG tablet  Patient-reported dosage and instructions? 2 daily  How many days do you have left? 2  Preferred Pharmacy? [facility] #[id number]  Pharmacy phone number (if available)? [phone]  ---------------------------------------------------------------------------  --------------  CALL BACK INFO  What is the best way for the office to contact you? OK to leave message on   voicemail  Preferred Call Back Phone Number?  [phone]

## 2022-02-09 DIAGNOSIS — F90.2 ATTENTION DEFICIT HYPERACTIVITY DISORDER (ADHD), COMBINED TYPE: ICD-10-CM

## 2022-02-09 NOTE — TELEPHONE ENCOUNTER
Medication:   Requested Prescriptions     Pending Prescriptions Disp Refills    methylphenidate (RITALIN) 20 MG tablet 60 tablet 0     Sig: Take 1 tablet by mouth 2 times daily for 30 days. Last Filled:  1/13/2022    Patient Phone Number: [phone] (home)     Last appt: 12/9/2021   Next appt: Visit date not found    Last OARRS:   RX Monitoring 12/13/2021   Periodic Controlled Substance Monitoring No signs of potential drug abuse or diversion identified.      PDMP Monitoring:    Last PDMP Sabrina smiley Reviewed [facility]):  Review User Review Instant Review Result   Nelli Silva 12/13/2021  2:50 PM Reviewed PDMP [1]     Preferred Pharmacy:   [address], [address]  [address] [place] [address] [address] [address]  Phone: [phone] Fax: [phone]

## 2022-02-09 NOTE — TELEPHONE ENCOUNTER
----- Message from Elease Samples sent at 2/9/2022  5:17 PM EST -----  Subject: Refill Request    QUESTIONS  Name of Medication? methylphenidate (RITALIN) 20 MG tablet  Patient-reported dosage and instructions? 20 mg   How many days do you have left? Unknown  Preferred Pharmacy? [facility] #[id number]  Pharmacy phone number (if available)? [phone]  ---------------------------------------------------------------------------  --------------  CALL BACK INFO  What is the best way for the office to contact you? OK to leave message on   voicemail  Preferred Call Back Phone Number?  [phone]

## 2022-02-10 RX ORDER — METHYLPHENIDATE HYDROCHLORIDE 20 MG/1
20 TABLET ORAL 2 TIMES DAILY
Qty: 60 TABLET | Refills: 0 | Status: SHIPPED | OUTPATIENT
Start: 2022-02-10 | End: 2022-03-10 | Stop reason: SDUPTHER

## 2022-03-10 DIAGNOSIS — F90.2 ATTENTION DEFICIT HYPERACTIVITY DISORDER (ADHD), COMBINED TYPE: ICD-10-CM

## 2022-03-10 RX ORDER — METHYLPHENIDATE HYDROCHLORIDE 20 MG/1
20 TABLET ORAL 2 TIMES DAILY
Qty: 60 TABLET | Refills: 0 | Status: SHIPPED | OUTPATIENT
Start: 2022-03-10 | End: 2022-04-12 | Stop reason: SDUPTHER

## 2022-03-10 NOTE — TELEPHONE ENCOUNTER
----- Message from Marlon Ball sent at 3/10/2022  4:50 PM EST -----  Subject: Refill Request    QUESTIONS  Name of Medication? methylphenidate (RITALIN) 20 MG tablet  Patient-reported dosage and instructions? 2 a day  How many days do you have left? 5  Preferred Pharmacy? [facility] #[id number]  Pharmacy phone number (if available)? [phone]  ---------------------------------------------------------------------------  --------------  CALL BACK INFO  What is the best way for the office to contact you? OK to leave message on   voicemail  Preferred Call Back Phone Number?  [phone]

## 2022-04-12 DIAGNOSIS — F90.2 ATTENTION DEFICIT HYPERACTIVITY DISORDER (ADHD), COMBINED TYPE: ICD-10-CM

## 2022-04-12 RX ORDER — METHYLPHENIDATE HYDROCHLORIDE 20 MG/1
20 TABLET ORAL 2 TIMES DAILY
Qty: 60 TABLET | Refills: 0 | Status: SHIPPED | OUTPATIENT
Start: 2022-04-12 | End: 2022-05-09 | Stop reason: SDUPTHER

## 2022-04-12 NOTE — TELEPHONE ENCOUNTER
Medication:   Requested Prescriptions     Pending Prescriptions Disp Refills    methylphenidate (RITALIN) 20 MG tablet 60 tablet 0     Sig: Take 1 tablet by mouth 2 times daily for 30 days. Last Filled: 3/10/22    Patient Phone Number: [phone] (home)     Last appt: 12/9/2021   Next appt: Visit date not found    Last OARRS:   RX Monitoring 12/13/2021   Periodic Controlled Substance Monitoring No signs of potential drug abuse or diversion identified.      PDMP Monitoring:    Last PDMP Karena Dominguez as Reviewed [facility]):  Review User Review Instant Review Result   Jorge A Ramos 12/13/2021  2:50 PM Reviewed PDMP [1]     Preferred Pharmacy:   [address], [address] [facility] [address] [address]  Phone: [phone] Fax: [phone]

## 2022-04-12 NOTE — TELEPHONE ENCOUNTER
----- Message from Lisha Adrianchanel sent at 4/11/2022  5:22 PM EDT -----  Subject: Refill Request    QUESTIONS  Name of Medication? methylphenidate (RITALIN) 20 MG tablet  Patient-reported dosage and instructions? one pill, twice a day  How many days do you have left? 2  Preferred Pharmacy? [facility] #[id number]  Pharmacy phone number (if available)? [phone]  ---------------------------------------------------------------------------  --------------  CALL BACK INFO  What is the best way for the office to contact you? OK to leave message on   voicemail  Preferred Call Back Phone Number? [phone]  ---------------------------------------------------------------------------  --------------  SCRIPT ANSWERS  Relationship to Patient?  Self

## 2022-05-09 DIAGNOSIS — F90.2 ATTENTION DEFICIT HYPERACTIVITY DISORDER (ADHD), COMBINED TYPE: ICD-10-CM

## 2022-05-09 RX ORDER — METHYLPHENIDATE HYDROCHLORIDE 20 MG/1
20 TABLET ORAL 2 TIMES DAILY
Qty: 60 TABLET | Refills: 0 | Status: SHIPPED | OUTPATIENT
Start: 2022-05-09 | End: 2022-06-07 | Stop reason: SDUPTHER

## 2022-05-09 NOTE — TELEPHONE ENCOUNTER
----- Message from Antonio Rivero sent at 5/9/2022  3:59 PM EDT -----  Subject: Refill Request    QUESTIONS  Name of Medication? methylphenidate (RITALIN) 20 MG tablet  Patient-reported dosage and instructions? 20 mg x2 daily   How many days do you have left? 0  Preferred Pharmacy? [facility] #[id number]  Pharmacy phone number (if available)? [phone]  ---------------------------------------------------------------------------  --------------  CALL BACK INFO  What is the best way for the office to contact you? OK to leave message on   voicemail  Preferred Call Back Phone Number? [phone]  ---------------------------------------------------------------------------  --------------  SCRIPT ANSWERS  Relationship to Patient?  Self

## 2022-05-09 NOTE — TELEPHONE ENCOUNTER
Medication:   Requested Prescriptions     Pending Prescriptions Disp Refills    methylphenidate (RITALIN) 20 MG tablet 60 tablet 0     Sig: Take 1 tablet by mouth 2 times daily for 30 days. Last Filled:  4/12/22    Patient Phone Number: [phone] (home)     Last appt: 12/9/2021   Next appt: Visit date not found    Last OARRS:   RX Monitoring 12/13/2021   Periodic Controlled Substance Monitoring No signs of potential drug abuse or diversion identified.      PDMP Monitoring:    Last PDMP [place] Records as Reviewed [facility]):  Review User Review Instant Review Result   Ariel Alvarenga 12/13/2021  2:50 PM Reviewed PDMP [1]     Preferred Pharmacy:   [address], [address] [facility] [address] [address]  Phone: [phone] Fax: [phone]

## 2022-06-06 DIAGNOSIS — F90.2 ATTENTION DEFICIT HYPERACTIVITY DISORDER (ADHD), COMBINED TYPE: ICD-10-CM

## 2022-06-06 NOTE — TELEPHONE ENCOUNTER
----- Message from Justice Addison sent at 6/6/2022  4:58 PM EDT -----  Subject: Refill Request    QUESTIONS  Name of Medication? methylphenidate (RITALIN) 20 MG tablet  Patient-reported dosage and instructions? Take 1 tablet by mouth 2 times   daily for 30 days. How many days do you have left? 4  Preferred Pharmacy? [facility] #[id number]  Pharmacy phone number (if available)? [phone]  ---------------------------------------------------------------------------  --------------  CALL BACK INFO  What is the best way for the office to contact you? OK to leave message on   voicemail  Preferred Call Back Phone Number? [phone]  ---------------------------------------------------------------------------  --------------  SCRIPT ANSWERS  Relationship to Patient?  Self

## 2022-06-07 RX ORDER — METHYLPHENIDATE HYDROCHLORIDE 20 MG/1
20 TABLET ORAL 2 TIMES DAILY
Qty: 60 TABLET | Refills: 0 | Status: SHIPPED | OUTPATIENT
Start: 2022-06-07 | End: 2022-07-05 | Stop reason: SDUPTHER

## 2022-06-07 NOTE — TELEPHONE ENCOUNTER
Medication:   Requested Prescriptions     Pending Prescriptions Disp Refills    methylphenidate (RITALIN) 20 MG tablet 60 tablet 0     Sig: Take 1 tablet by mouth 2 times daily for 30 days. Last Filled:  5/9/22    Patient Phone Number: [phone] (home)     Last appt: 12/9/2021   Next appt: Visit date not found    Last OARRS:   RX Monitoring 12/13/2021   Periodic Controlled Substance Monitoring No signs of potential drug abuse or diversion identified.      PDMP Monitoring:    Last PDMP Sherald Spurling as Reviewed [facility]):  Review User Review Instant Review Result   Camron Gómez 12/13/2021  2:50 PM Reviewed PDMP [1]     Preferred Pharmacy:   [address], [address] [facility] [address] [address]  Phone: [phone] Fax: [phone]

## 2022-07-05 DIAGNOSIS — F90.2 ATTENTION DEFICIT HYPERACTIVITY DISORDER (ADHD), COMBINED TYPE: ICD-10-CM

## 2022-07-05 RX ORDER — METHYLPHENIDATE HYDROCHLORIDE 20 MG/1
20 TABLET ORAL 2 TIMES DAILY
Qty: 60 TABLET | Refills: 0 | Status: SHIPPED | OUTPATIENT
Start: 2022-07-05 | End: 2022-08-04 | Stop reason: SDUPTHER

## 2022-07-05 NOTE — TELEPHONE ENCOUNTER
Medication:   Requested Prescriptions     Pending Prescriptions Disp Refills    methylphenidate (RITALIN) 20 MG tablet 60 tablet 0     Sig: Take 1 tablet by mouth 2 times daily for 30 days. Last Filled:  6/7/2022    Patient Phone Number: [phone] (home)     Last appt: 12/9/2021   Next appt: 12/15/2022    Last OARRS:   RX Monitoring 12/13/2021   Periodic Controlled Substance Monitoring No signs of potential drug abuse or diversion identified.      PDMP Monitoring:    Last PDMP Miracle smiley Reviewed [facility]):  Review User Review Instant Review Result   Karson Sharyn 12/13/2021  2:50 PM Reviewed PDMP [1]     Preferred Pharmacy:   [address], [address]  [address] [facility] [address] [address]  Phone: [phone] Fax: [phone]

## 2022-07-05 NOTE — TELEPHONE ENCOUNTER
----- Message from Alexandr [facility] [employer] sent at 7/5/2022  4:46 PM EDT -----  Subject: Refill Request    QUESTIONS  Name of Medication? methylphenidate (RITALIN) 20 MG tablet  Patient-reported dosage and instructions? 20mg twice daily   How many days do you have left? 5  [id number]  Pharmacy phone number (if available)? [phone]  Additional Information for Provider? 60 days  ---------------------------------------------------------------------------  --------------  CALL BACK INFO  What is the best way for the office to contact you? OK to leave message on   voicemail  Preferred Call Back Phone Number? [phone]  ---------------------------------------------------------------------------  --------------  SCRIPT ANSWERS  Relationship to Patient?  Self

## 2022-08-02 DIAGNOSIS — F90.2 ATTENTION DEFICIT HYPERACTIVITY DISORDER (ADHD), COMBINED TYPE: ICD-10-CM

## 2022-08-02 NOTE — TELEPHONE ENCOUNTER
Medication:   Requested Prescriptions      No prescriptions requested or ordered in this encounter      Last Filled:  7/5/2022    Patient Phone Number: [phone] (home)     Last appt: 12/9/2021   Next appt: 12/15/2022    Last OARRS:   RX Monitoring 12/13/2021   Periodic Controlled Substance Monitoring No signs of potential drug abuse or diversion identified.      PDMP Monitoring:    Last PDMP Juan Krueger as Reviewed [facility]):  Review User Review Instant Review Result   Mk Landers 12/13/2021  2:50 PM Reviewed PDMP [1]     Preferred Pharmacy:   [address], [address]  [address] [facility] [address] [address]  Phone: [phone] Fax: [phone]

## 2022-08-02 NOTE — TELEPHONE ENCOUNTER
----- Message from Arian Collado sent at 8/2/2022 10:31 AM EDT -----  Subject: Refill Request    QUESTIONS  Name of Medication? methylphenidate (RITALIN) 20 MG tablet  Patient-reported dosage and instructions? 1 2xd AM and PM  How many days do you have left? 5  Preferred Pharmacy? [facility] #[id number]  Pharmacy phone number (if available)? [phone]  ---------------------------------------------------------------------------  --------------  CALL BACK INFO  What is the best way for the office to contact you? OK to leave message on   voicemail  Preferred Call Back Phone Number? [phone]  ---------------------------------------------------------------------------  --------------  SCRIPT ANSWERS  Relationship to Patient?  Self

## 2022-08-04 RX ORDER — METHYLPHENIDATE HYDROCHLORIDE 20 MG/1
20 TABLET ORAL 2 TIMES DAILY
Qty: 60 TABLET | Refills: 0 | Status: SHIPPED | OUTPATIENT
Start: 2022-08-04 | End: 2022-09-01 | Stop reason: SDUPTHER

## 2022-09-01 DIAGNOSIS — F90.2 ATTENTION DEFICIT HYPERACTIVITY DISORDER (ADHD), COMBINED TYPE: ICD-10-CM

## 2022-09-01 RX ORDER — METHYLPHENIDATE HYDROCHLORIDE 20 MG/1
20 TABLET ORAL 2 TIMES DAILY
Qty: 60 TABLET | Refills: 0 | Status: SHIPPED | OUTPATIENT
Start: 2022-09-01 | End: 2022-09-29 | Stop reason: SDUPTHER

## 2022-09-01 NOTE — TELEPHONE ENCOUNTER
Medication:   Requested Prescriptions     Pending Prescriptions Disp Refills    methylphenidate (RITALIN) 20 MG tablet 60 tablet 0     Sig: Take 1 tablet by mouth 2 times daily for 30 days. Last Filled:  8/4/22    Patient Phone Number: [phone] (home)     Last appt: 12/9/2021   Next appt: 12/15/2022    Last OARRS:   RX Monitoring 12/13/2021   Periodic Controlled Substance Monitoring No signs of potential drug abuse or diversion identified.      PDMP Monitoring:    Last PDMP Citlali Centeno as Reviewed [facility]):  Review User Review Instant Review Result   May Vila 12/13/2021  2:50 PM Reviewed PDMP [1]     Preferred Pharmacy:   [address], [address]  [address] [facility] [address] [address]  Phone: [phone] Fax: [phone]

## 2022-09-01 NOTE — TELEPHONE ENCOUNTER
----- Message from Shirley Rosa sent at 9/1/2022  2:43 PM EDT -----  Subject: Refill Request    QUESTIONS  Name of Medication? methylphenidate (RITALIN) 20 MG tablet  Patient-reported dosage and instructions? twice a day, x 7 days,   How many days do you have left? Unknown  Preferred Pharmacy? [facility] #[id number]  Pharmacy phone number (if available)? [phone]  ---------------------------------------------------------------------------  --------------  CALL BACK INFO  What is the best way for the office to contact you? OK to leave message on   voicemail  Preferred Call Back Phone Number? [phone]  ---------------------------------------------------------------------------  --------------  SCRIPT ANSWERS  Relationship to Patient?  Self

## 2022-09-29 DIAGNOSIS — F90.2 ATTENTION DEFICIT HYPERACTIVITY DISORDER (ADHD), COMBINED TYPE: ICD-10-CM

## 2022-09-29 RX ORDER — METHYLPHENIDATE HYDROCHLORIDE 20 MG/1
20 TABLET ORAL 2 TIMES DAILY
Qty: 60 TABLET | Refills: 0 | Status: SHIPPED | OUTPATIENT
Start: 2022-09-29 | End: 2022-10-03 | Stop reason: SDUPTHER

## 2022-09-29 NOTE — TELEPHONE ENCOUNTER
Medication:   Requested Prescriptions     Pending Prescriptions Disp Refills    methylphenidate (RITALIN) 20 MG tablet 60 tablet 0     Sig: Take 1 tablet by mouth 2 times daily for 30 days. Last Filled:  9/1/22    Patient Phone Number: [phone] (home)     Last appt: 12/9/2021   Next appt: 12/15/2022    Last OARRS:   RX Monitoring 12/13/2021   Periodic Controlled Substance Monitoring No signs of potential drug abuse or diversion identified.      PDMP Monitoring:    Last PDMP Elizabeth Olson as Reviewed [facility]):  Review User Review Instant Review Result   Poonam Pulido 12/13/2021  2:50 PM Reviewed PDMP [1]     Preferred Pharmacy:   [facility]a Prader [address] [place] [address], [address]  [address] [facility] [address] [address]  Phone: [phone] Fax: [phone]

## 2022-09-29 NOTE — TELEPHONE ENCOUNTER
----- Message from Benjamin Garland sent at 9/29/2022  4:43 PM EDT -----  Subject: Refill Request    QUESTIONS  Name of Medication? methylphenidate (RITALIN) 20 MG tablet  Patient-reported dosage and instructions? Take 1 tablet by mouth 2 times   daily  How many days do you have left? 5  Preferred Pharmacy? [facility] #[id number]  Pharmacy phone number (if available)? [phone]  ---------------------------------------------------------------------------  --------------  CALL BACK INFO  What is the best way for the office to contact you? OK to leave message on   voicemail, OK to respond with electronic message via [employer] portal (only   for patients who have registered [employer] account)  Preferred Call Back Phone Number? [phone]  ---------------------------------------------------------------------------  --------------  SCRIPT ANSWERS  Relationship to Patient?  Self

## 2022-10-03 DIAGNOSIS — F90.2 ATTENTION DEFICIT HYPERACTIVITY DISORDER (ADHD), COMBINED TYPE: ICD-10-CM

## 2022-10-03 RX ORDER — METHYLPHENIDATE HYDROCHLORIDE 20 MG/1
20 TABLET ORAL 2 TIMES DAILY
Qty: 60 TABLET | Refills: 0 | Status: SHIPPED | OUTPATIENT
Start: 2022-10-03 | End: 2022-10-28 | Stop reason: SDUPTHER

## 2022-10-03 NOTE — TELEPHONE ENCOUNTER
----- Message from Sonia Garcia sent at 10/3/2022  4:55 PM EDT -----  Subject: Medication Problem    Medication: methylphenidate (RITALIN) 20 MG tablet  Dosage: Take one tablet by mouth twice daily  Ordering Provider: Dr. Elliot Fischer    Question/Problem: Pt stated his original pharmacy, in [facility] does not   have his medication in stock and he would like the prescription to be sent   to the [facility] in [place] as they have the medication in stock.   Additional Information for Provider: Quantity 60 tablets    Pharmacy: [address], [address] [place] [address] [phone]    ---------------------------------------------------------------------------  --------------  Mai Hardy INFO  [phone]; OK to leave message on voicemail, OK to respond with   electronic message via [employer] portal (only for patients who have   registered [employer] account)  ---------------------------------------------------------------------------  --------------    SCRIPT ANSWERS  Relationship to Patient: Self

## 2022-10-28 DIAGNOSIS — F90.2 ATTENTION DEFICIT HYPERACTIVITY DISORDER (ADHD), COMBINED TYPE: ICD-10-CM

## 2022-10-28 RX ORDER — METHYLPHENIDATE HYDROCHLORIDE 20 MG/1
20 TABLET ORAL 2 TIMES DAILY
Qty: 60 TABLET | Refills: 0 | Status: SHIPPED | OUTPATIENT
Start: 2022-10-28 | End: 2022-11-29 | Stop reason: SDUPTHER

## 2022-10-28 NOTE — TELEPHONE ENCOUNTER
Medication:   Requested Prescriptions     Pending Prescriptions Disp Refills    methylphenidate (RITALIN) 20 MG tablet 60 tablet 0     Sig: Take 1 tablet by mouth 2 times daily for 30 days. Last Filled:  10/3/22    Patient Phone Number: [phone] (home)     Last appt: 12/9/2021   Next appt: 12/15/2022    Last OARRS:   RX Monitoring 12/13/2021   Periodic Controlled Substance Monitoring No signs of potential drug abuse or diversion identified.      PDMP Monitoring:    Last PDMP Lynsey Dean as Reviewed [facility]):  Review User Review Instant Review Result   Bailee Gael 12/13/2021  2:50 PM Reviewed PDMP [1]     Preferred Pharmacy:   [address], [address] [facility] [address] [address]  Phone: [phone] Fax: [phone]

## 2022-10-28 NOTE — TELEPHONE ENCOUNTER
----- Message from David Willson sent at 10/28/2022  1:48 PM EDT -----  Subject: Refill Request    QUESTIONS  Name of Medication? methylphenidate (RITALIN) 20 MG tablet  Patient-reported dosage and instructions? Take 1 tablet by mouth 2 times   daily  How many days do you have left? 5  Preferred Pharmacy? [facility] #[id number]  Pharmacy phone number (if available)? [phone]  ---------------------------------------------------------------------------  --------------  Aretta Kill INFO  What is the best way for the office to contact you? OK to leave message on   voicemail, OK to respond with electronic message via amazingtunes portal (only   for patients who have registered amazingtunes account)  Preferred Call Back Phone Number? [phone]  ---------------------------------------------------------------------------  --------------  SCRIPT ANSWERS  Relationship to Patient?  Self

## 2022-11-29 DIAGNOSIS — F90.2 ATTENTION DEFICIT HYPERACTIVITY DISORDER (ADHD), COMBINED TYPE: ICD-10-CM

## 2022-11-29 RX ORDER — METHYLPHENIDATE HYDROCHLORIDE 20 MG/1
20 TABLET ORAL 2 TIMES DAILY
Qty: 60 TABLET | Refills: 0 | Status: SHIPPED | OUTPATIENT
Start: 2022-11-29 | End: 2022-12-29

## 2022-11-29 NOTE — TELEPHONE ENCOUNTER
----- Message from Merline Justice sent at 41/58/9026  3:25 PM EST -----  Subject: Refill Request    QUESTIONS  Name of Medication? methylphenidate (RITALIN) 20 MG tablet  Patient-reported dosage and instructions? 2xdaily   How many days do you have left? 4  [id number]  Pharmacy phone number (if available)? [phone]  ---------------------------------------------------------------------------  --------------  CALL BACK INFO  What is the best way for the office to contact you? OK to respond with   electronic message via [email or web address] portal (only for patients who have   registered [email or web address] account)  Preferred Call Back Phone Number? [phone]  ---------------------------------------------------------------------------  --------------  SCRIPT ANSWERS  Relationship to Patient?  Self

## 2022-12-01 RX ORDER — FLUTICASONE PROPIONATE 50 MCG
SPRAY, SUSPENSION (ML) NASAL
Qty: 48 G | Refills: 12 | Status: SHIPPED | OUTPATIENT
Start: 2022-12-01

## 2022-12-01 NOTE — TELEPHONE ENCOUNTER
Medication:   Requested Prescriptions     Pending Prescriptions Disp Refills    fluticasone (FLONASE) 50 MCG/ACT nasal spray [Pharmacy Med Name: FLUTICASONE 50MCG NASAL SP (120) RX] 48 g      Sig: SHAKE LIQUID AND USE 2 SPRAYS IN [facility] NOSTRIL DAILY          Patient Phone Number: [phone] (home)     Last appt: 12/9/2021   Next appt: 12/15/2022    Last OARRS:   RX Monitoring 12/13/2021   Periodic Controlled Substance Monitoring No signs of potential drug abuse or diversion identified.      PDMP Monitoring:    Last PDMP Paola Love as Reviewed [facility]):  Review User Review Instant Review Result   Lesly Lee 12/13/2021  2:50 PM Reviewed PDMP [1]     Preferred Pharmacy:   [address], [address]  [address] [facility] [address] [address]  Phone: [phone] Fax: [phone]

## 2022-12-15 ENCOUNTER — OFFICE VISIT (OUTPATIENT)
Dept: FAMILY MEDICINE CLINIC | Age: 42
End: 2022-12-15

## 2022-12-15 VITALS
TEMPERATURE: 98.1 F | SYSTOLIC BLOOD PRESSURE: 110 MMHG | DIASTOLIC BLOOD PRESSURE: 70 MMHG | BODY MASS INDEX: 25.79 KG/M2 | WEIGHT: 184.2 LBS | HEIGHT: 71 IN

## 2022-12-15 DIAGNOSIS — Z00.00 WELL ADULT EXAM: Primary | ICD-10-CM

## 2022-12-15 DIAGNOSIS — F90.2 ATTENTION DEFICIT HYPERACTIVITY DISORDER (ADHD), COMBINED TYPE: ICD-10-CM

## 2022-12-15 DIAGNOSIS — F60.5 OBSESSIVE-COMPULSIVE PERSONALITY DISORDER (HCC): ICD-10-CM

## 2022-12-15 DIAGNOSIS — F17.200 TOBACCO DEPENDENCE: ICD-10-CM

## 2022-12-15 DIAGNOSIS — Z23 NEEDS FLU SHOT: ICD-10-CM

## 2022-12-15 DIAGNOSIS — M79.672 LEFT FOOT PAIN: ICD-10-CM

## 2022-12-15 RX ORDER — FLUOXETINE HYDROCHLORIDE 20 MG/1
20 CAPSULE ORAL DAILY
Qty: 90 CAPSULE | Refills: 3 | Status: SHIPPED | OUTPATIENT
Start: 2022-12-15

## 2022-12-15 RX ORDER — METHYLPHENIDATE HYDROCHLORIDE 20 MG/1
20 TABLET ORAL 3 TIMES DAILY
Qty: 90 TABLET | Refills: 0
Start: 2022-12-15 | End: 2022-12-19 | Stop reason: SDUPTHER

## 2022-12-15 ASSESSMENT — PATIENT HEALTH QUESTIONNAIRE - PHQ9
1. LITTLE INTEREST OR PLEASURE IN DOING THINGS: 1
8. MOVING OR SPEAKING SO SLOWLY THAT OTHER PEOPLE COULD HAVE NOTICED. OR THE OPPOSITE, BEING SO FIGETY OR RESTLESS THAT YOU HAVE BEEN MOVING AROUND A LOT MORE THAN USUAL: 0
SUM OF ALL RESPONSES TO PHQ QUESTIONS 1-9: 2
SUM OF ALL RESPONSES TO PHQ QUESTIONS 1-9: 2
SUM OF ALL RESPONSES TO PHQ9 QUESTIONS 1 & 2: 1
SUM OF ALL RESPONSES TO PHQ QUESTIONS 1-9: 2
5. POOR APPETITE OR OVEREATING: 0
4. FEELING TIRED OR HAVING LITTLE ENERGY: 1
3. TROUBLE FALLING OR STAYING ASLEEP: 0
2. FEELING DOWN, DEPRESSED OR HOPELESS: 0
9. THOUGHTS THAT YOU WOULD BE BETTER OFF DEAD, OR OF HURTING YOURSELF: 0
6. FEELING BAD ABOUT YOURSELF - OR THAT YOU ARE A FAILURE OR HAVE LET YOURSELF OR YOUR FAMILY DOWN: 0
SUM OF ALL RESPONSES TO PHQ QUESTIONS 1-9: 2
7. TROUBLE CONCENTRATING ON THINGS, SUCH AS READING THE NEWSPAPER OR WATCHING TELEVISION: 0
10. IF YOU CHECKED OFF ANY PROBLEMS, HOW DIFFICULT HAVE THESE PROBLEMS MADE IT FOR YOU TO DO YOUR WORK, TAKE CARE OF THINGS AT HOME, OR GET ALONG WITH OTHER PEOPLE: 0

## 2022-12-15 NOTE — PROGRESS NOTES
[facility] VISIT    Patient is here for their Medicare Annual Wellness Visit And ADD. Would like to discuss increasing the Ritalin to 3 times a day. Last eye exam: unsure  Last dental exam: goes twice a year, has upcoming appointment next month, may be getting veneers as having some issues related to dipping   Exercise: Cardio, walking, jogging, and weight lifting  Diet: in general, a \"healthy\" diet  , on average, 3 meals per day    How would you rate your overall health? : Good        Fall Risk 12/15/2022   2 or more falls in past year? no   Fall with injury in past year? no       PHQ Scores 12/15/2022 12/3/2020 12/3/2019 11/22/2017   PHQ2 Score 1 1 3 0   PHQ9 Score 2 1 9 0       Do you always wear a seat belt in the car?: Yes      Have you noted any problems with hearing?: No  Have you noted any vision problems?: yes dif seeing small things up close  Do you have concerns about your sexual health?: no  In the past month how much has pain been an issue for you?:  Somewhat  In the past month have you had issues with anxiety, loneliness, irritability or fatigue:  A little bit    Do you take opioid medications even sometimes? No     Living Will: No,   Additional information provided      Healthcare Decision Maker:    Primary Decision Maker: Juan Jose Henderson - Parent    Secondary Decision Maker: Mansi Hodges - Parent    Supplemental (Other) Decision Maker: Mary Lou Giordano - Brother/Sister - [phone]  Click here to complete Healthcare Decision Makers including selection of the Healthcare Decision Maker Relationship (ie \"Primary\"). Today we documented Decision Maker(s). The patient will provide ACP documents. Who lives at home with you: no one  Do you have any pets? dog  Do you have any services coming to your home (meals on wheels, home health, etc) ? : no      Do you need help with:  Using the phone:  No  Bathing: No  Dressing:  No  Toileting: No  Transportation:  No  Shopping: No  Preparing meals: No  Housework/Laundry: No  Medications: No  Money management: No    Does your home have:  Unsecured throw rugs: No  Grab bars in bathroom: Yes  Walk in shower: No  Seat in shower: No  Lit pathways for night (nightlights): Yes    Memory:  Have you or anyone close to you expressed concerns about your memory: Yes: has past traumatic brain injury, finds forgets things easily and quickly and can't remember a lot of new things at one time. Knows:  Month: Yes  Day: Yes  Year: Yes  Day of Week: Yes  Able to Recall (tree, fork, ball) : Yes    Patient history:   Patient's medications, allergies, past medical, surgical, social and family histories were reviewed and updated in the EHR under History. Social History     Substance and Sexual Activity   Alcohol Use No    Alcohol/week: 0.0 standard drinks       Social History     Substance and Sexual Activity   Drug Use No       Social History     Tobacco Use   Smoking Status Some Days    Types: Cigarettes   Smokeless Tobacco Current    Types: Chew   Tobacco Comments    4 cig a week, 1/2 can a day           Care Team:  Patient's list of care team members was updated in EHR under the Snap Shot. Immunizations: Reviewed with patient. Health Maintenance Due   Topic Date Due    Hepatitis C screen  Never done    COVID-19 Vaccine (3 - Booster for Pfizer series) 02/18/2022       CHRONIC CONDITIONS / ACUTE COMPLAINTS     OCD- taking prozac daily, no SE. Feels like sx stable. On disability. Still delivering pizzas as well. ADHD s/p TBI - notes as day goes on more issues with focus and concentration. Hard to remember lists of things to do. States in beginning of day sx well controlled, thinks clearly, lets him have \"ideas,\" no day dreaming. States when wears off in afternoon/evening starts to struggle more. Has been having more foot pain, heel and lateral foot pain cata on left. Exfoliates feet several times a day but doesn't help.  Wonders if needs to see foot doctor or get a pedicure. Concerned about shoes - if he has the right kind. Thinks they are good and have a wide toe box and good support but concerned bc heel hurts to walk on it and side of foot. Very focused on feet today. States 2-4 weeks ago, states was very sick, stayed in bed. Still coughing some but getter. Energy better. Physical Exam:    Body mass index is 25.69 kg/m². Vitals:    12/15/22 1319   BP: 110/70   Site: Left Upper Arm   Position: Sitting   Cuff Size: Large Adult   Temp: 98.1 °F (36.7 °C)   TempSrc: Infrared   Weight: 184 lb 3.2 oz (83.6 kg)   Height: 5' 11\" (1.803 m)     Wt Readings from Last 3 Encounters:   12/15/22 184 lb 3.2 oz (83.6 kg)   12/09/21 188 lb 6.4 oz (85.5 kg)   12/03/20 186 lb (84.4 kg)       GENERAL:Alert and oriented x 4 NAD, affect appropriate and overweight, well hydrated, well developed. LUNG:clear to auscultation bilaterally with normal respiratory effort  CV: Normal heart sounds, regular rate and rhythm without murmurs  EXTREMETY: no loss of hair, no edema, normal pedal pulses bilaterally  Feet look normal, bunions but o.w normal    Was the timed get up and go unsteady or longer than 20 seconds: No        Assessment/Plan:    Froylan Rankin was seen today for medicare aw. Diagnoses and all orders for this visit:    Well adult exam  -     Pneumococcal, PPSV23, PNEUMOVAX 21, (age 2 yrs+), SC/IM  Recommended screenings discussed and ordered if patient agreed  Recommended vaccinations discussed and ordered if patient agreed  Encouraged healthy diet   Encouraged regular exercise and maintaining a healthy weight    Individualized [address] included in patient instructions and AVS    Obsessive-compulsive personality disorder ([facility] [address]  -Stable, continue current medications. Attention deficit hyperactivity disorder (ADHD), combined type  -     methylphenidate (RITALIN) 20 MG tablet; Take 1 tablet by mouth 3 times daily for 30 days.  Do not take 3rd dose after 5 PM  Increase to TID  Controlled Substances Monitoring:   OARRS report reviewed  Periodic Controlled Substance Monitoring: No signs of potential drug abuse or diversion identified. Janett Ozuna MD)      Tobacco dependence  -     Pneumococcal, PPSV23, PNEUMOVAX 21, (age 2 yrs+), SC/IM  Discussed needs to stop smoking, pt thinking about it but not ready yet      Left foot pain  Discussed does not need to exfoliate so much, may be contributing to pain. Once a week is plenty  F/u with podiatry      Needs flu shot  -     Influenza, FLUCELVAX, (age 10 mo+), IM, Preservative Free, 0.5 mL  -     Pneumococcal, PPSV23, PNEUMOVAX 23, (age 2 yrs+), SC/IM    Other orders  -     FLUoxetine (PROZAC) 20 MG capsule; Take 1 capsule by mouth daily          Return in about 1 year (around 12/15/2023) for AWV, 30 min.          Portions of Note per  Jose Gilbert CMA AAMA with corrections and edits per Janett Ozuna MD.  I agree with entirety of note and was present and performed history and physical.  I also confirm that the note above accurately reflects all work, treatment, procedures, and medical decision making performed by me, Janett Ozuna MD

## 2022-12-15 NOTE — PROGRESS NOTES
Immunization(s) given during visit:     Immunizations Administered       Name Date Dose Route    Influenza, FLUCELVAX, (age 10 mo+), MDCK, PF, 0.5mL 12/15/2022 0.5 mL Intramuscular    Site: Deltoid- Left    Lot: [id number]    NDC: 95154-725-14    Pneumococcal Polysaccharide ([id number]) 12/15/2022 0.5 mL Intramuscular    Site: Deltoid- Right    Lot: [id number]    NDC: 9233-9254-13             Patient instructed to remain in clinic for 20 minutes after injection and was advised to report any adverse reaction to me immediately.

## 2022-12-15 NOTE — PATIENT INSTRUCTIONS
--Schedule your COVID booster at your local pharmacy. After your initial 2 dose series you can receive any of the vaccines for your booster doses. You can get a dose 2 months after your last one. --Make appt for eye exam         --Please bring in a copy of your living will and healthcare power of  to put in your chart. --Link to forms:   [email or web address]      Advance Directives and DNR    Decision making at the end of life is difficult for patients, families and health care providers. Since the early 1990s, a number of forms have been developed to help people express their wishes in advance. What are \"advance directives\"? Advance directives are documents that can help you remain in charge of your health care even after you can no longer make decisions for yourself. The two most common forms of written advance directives are the living will and durable power of  for healthcare. Some people seek an s services to complete these documents; however this is not required. You can complete these documents yourself and have them either notarized or witnessed by two people who are over 25 and not related to you by blood or marriage. What is a \"living will\"? A living will is a document that tells your doctor how you want to be treated if when you are determined to be terminally ill or permanently unconscious and you cannot make decisions for yourself. You can use a living will if you want to avoid life-prolonging treatments such as cardiopulmonary resuscitation (CPR), kidney dialysis or breathing machines. You can use your living will to tell your doctor that you just want to be pain free at the end of our life. In [place], the living will is sometimes called a \"declaration\".  A living will form can be obtained from attorneys, [employer], and healthcare facilities. This signed form must be notarized or witnessed. What is a \"durable power of  for healthcare\"? A medical power of  (medical POA) is another type of advance directive that allows you to name a person to make health care decisions for you if and when you become unable to make them for yourself. The person you name to make decisions on your behalf is some times called your health care surrogate, agent, proxy or -in-fact. The person who holds your medical POA can respond to medical situations you might not have anticipated and make decisions for you empowered by knowledge of your values and wishes. The medical POA form can be obtained from attorneys, [employer], and healthcare facilities. This signed form must be notarized or witnessed. The medical POA document is different from the power of  form that authorizes someone to make financial transactions for you. What is cardiopulmonary resuscitation (CPR)? CPR is a technique useful in many emergencies, including heart attack or near drowning, in which someone's breathing or heartbeat has stopped. CPR may include chest compression, mouth-to-mouth or other rescue breathing and/or electric shock. What is a DNR -Comfort Care form (a.k.a. [facility])? DNR means do not resuscitate. A DNR is a medical order given by a physician or other legally authorized prescriber. It addresses the various methods used to revive people whose hearts have stopped beating /or who have stopped breathing. If a person has a [facility] order, he will receive care that eases pain and suffering but no cardio-pulmonary resuscitation (CPR) to save or prolong life. The [facility] becomes active as soon as it is signed by the doctor or advanced practice nurse. The [facility] is a standard form which can be obtained from the [address] [facility] or healthcare facilities. What is DNR Comfort Care - Arrest (a.k.a. [address] [facility])?      The [facility] - Arrest is similar to the [facility] but it only becomes active if and when the person has a cardiac and/or respiratory arrest (i.e. the person stops breathing or his heart stops beating). The [address] is a standard form which can be obtained from the [address] [facility] or healthcare facilities.

## 2022-12-19 DIAGNOSIS — F90.2 ATTENTION DEFICIT HYPERACTIVITY DISORDER (ADHD), COMBINED TYPE: ICD-10-CM

## 2022-12-19 RX ORDER — METHYLPHENIDATE HYDROCHLORIDE 20 MG/1
20 TABLET ORAL 3 TIMES DAILY
Qty: 90 TABLET | Refills: 0 | Status: SHIPPED | OUTPATIENT
Start: 2022-12-19 | End: 2022-12-22 | Stop reason: SDUPTHER

## 2022-12-19 NOTE — TELEPHONE ENCOUNTER
Medication:   Requested Prescriptions     Pending Prescriptions Disp Refills    methylphenidate (RITALIN) 20 MG tablet 90 tablet 0     Sig: Take 1 tablet by mouth 3 times daily for 30 days. Do not take 3rd dose after 5 PM          Patient Phone Number: [phone] (home)     Last appt: 12/15/2022   Next appt: Visit date not found    Last OARRS:   RX Monitoring 12/15/2022   Periodic Controlled Substance Monitoring No signs of potential drug abuse or diversion identified.      PDMP Monitoring:    Last PDMP Leyla Gordillo as Reviewed [facility]):  Review User Review Instant Review Result   Shell Cervantesa 12/15/2022  1:26 PM Reviewed PDMP [1]     Preferred Pharmacy:   [address], [address] [place] [address] [address] [place] [address]  Elizabeth Solis [phone]  Phone: [phone] Fax: [phone]

## 2022-12-19 NOTE — TELEPHONE ENCOUNTER
----- Message from Lore Kerns sent at 12/19/2022 11:31 AM EST -----  Subject: Refill Request    QUESTIONS  Name of Medication? methylphenidate (RITALIN) 20 MG tablet  Patient-reported dosage and instructions? three times per day   How many days do you have left? 14  Preferred Pharmacy? [facility] #[id number]  Pharmacy phone number (if available)? [phone]  Additional Information for Provider? pt will be out on 12/25  needs 90   ---------------------------------------------------------------------------  --------------  CALL BACK INFO  What is the best way for the office to contact you? OK to leave message on   voicemail  Preferred Call Back Phone Number? [phone]  ---------------------------------------------------------------------------  --------------  SCRIPT ANSWERS  Relationship to Patient?  Self

## 2022-12-22 DIAGNOSIS — F90.2 ATTENTION DEFICIT HYPERACTIVITY DISORDER (ADHD), COMBINED TYPE: ICD-10-CM

## 2022-12-22 RX ORDER — METHYLPHENIDATE HYDROCHLORIDE 20 MG/1
20 TABLET ORAL 3 TIMES DAILY
Qty: 20 TABLET | Refills: 0 | Status: SHIPPED | OUTPATIENT
Start: 2022-12-22 | End: 2023-01-21

## 2022-12-22 NOTE — TELEPHONE ENCOUNTER
----- Message from Shazia Fung sent at 12/22/2022  4:59 PM EST -----  Subject: Medication Problem    Medication: methylphenidate (RITALIN) 20 MG tablet  Dosage: 3 per day   Ordering Provider: shad    Question/Problem: pt said the pharmacy only had 70 out of the 90  he   needs 20 to go to the other pharmacy       Pharmacy: [address], [address] [place] [address] [phone]    ---------------------------------------------------------------------------  --------------  [facility]  [phone]; OK to leave message on voicemail  ---------------------------------------------------------------------------  --------------    SCRIPT ANSWERS  Relationship to Patient: Self

## 2022-12-22 NOTE — TELEPHONE ENCOUNTER
Medication:   Requested Prescriptions     Pending Prescriptions Disp Refills    methylphenidate (RITALIN) 20 MG tablet 90 tablet 0     Sig: Take 1 tablet by mouth 3 times daily for 30 days. Do not take 3rd dose after 5 PM      Last Filled:  12/19/22    Patient Phone Number: [phone] (home)     Last appt: 12/15/2022   Next appt: Visit date not found    Last OARRS:   RX Monitoring 12/15/2022   Periodic Controlled Substance Monitoring No signs of potential drug abuse or diversion identified.      PDMP Monitoring:    Last PDMP Osorio Sweeney as Reviewed [facility]):  Review User Review Instant Review Result   Dang Yi 12/15/2022  1:26 PM Reviewed PDMP [1]     Preferred Pharmacy:   [address], [address] [place] [address] 2089 [facility]  Elizabeth Solis [phone]  Phone: [phone] Fax: [phone]

## 2023-01-20 DIAGNOSIS — F90.2 ATTENTION DEFICIT HYPERACTIVITY DISORDER (ADHD), COMBINED TYPE: ICD-10-CM

## 2023-01-20 RX ORDER — METHYLPHENIDATE HYDROCHLORIDE 20 MG/1
20 TABLET ORAL 3 TIMES DAILY
Qty: 90 TABLET | Refills: 0 | Status: SHIPPED | OUTPATIENT
Start: 2023-01-20 | End: 2023-02-19

## 2023-01-20 NOTE — TELEPHONE ENCOUNTER
Medication:   Requested Prescriptions     Pending Prescriptions Disp Refills    methylphenidate (RITALIN) 20 MG tablet 20 tablet 0     Sig: Take 1 tablet by mouth 3 times daily for 30 days. Do not take 3rd dose after 5 PM      Last Filled:  12/22/22    Patient Phone Number: [phone] (home)     Last appt: 12/15/2022   Next appt: Visit date not found    Last OARRS:   RX Monitoring 12/15/2022   Periodic Controlled Substance Monitoring No signs of potential drug abuse or diversion identified.      PDMP Monitoring:    Last PDMP Sunday Justa as Reviewed [facility]):  Review User Review Instant Review Result   Shawn Thakkar 12/15/2022  1:26 PM Reviewed PDMP [1]     Preferred Pharmacy:   [address], [address]  [address] [place] [address] [address] [phone]  Phone: [phone] Fax: [phone]

## 2023-01-20 NOTE — TELEPHONE ENCOUNTER
----- Message from Braden Curling sent at 1/20/2023 10:00 AM EST -----  Subject: Refill Request    QUESTIONS  Name of Medication? methylphenidate (RITALIN) 20 MG tablet  Patient-reported dosage and instructions? 20mg three tikes per day   How many days do you have left? 0  Preferred Pharmacy? [facility] #[id number]  Pharmacy phone number (if available)? [phone]  Additional Information for Provider? 90 tabs should be called in around   Jan 24, or Jan 25th   ---------------------------------------------------------------------------  --------------  [facility] INFO  What is the best way for the office to contact you? OK to leave message on   voicemail  Preferred Call Back Phone Number? [phone]  ---------------------------------------------------------------------------  --------------  SCRIPT ANSWERS  Relationship to Patient?  Self

## 2023-02-14 DIAGNOSIS — F90.2 ATTENTION DEFICIT HYPERACTIVITY DISORDER (ADHD), COMBINED TYPE: ICD-10-CM

## 2023-02-14 RX ORDER — METHYLPHENIDATE HYDROCHLORIDE 20 MG/1
20 TABLET ORAL 3 TIMES DAILY
Qty: 90 TABLET | Refills: 0 | Status: SHIPPED | OUTPATIENT
Start: 2023-02-14 | End: 2023-03-16

## 2023-02-14 NOTE — TELEPHONE ENCOUNTER
Medication:   Requested Prescriptions     Pending Prescriptions Disp Refills    methylphenidate (RITALIN) 20 MG tablet 90 tablet 0     Sig: Take 1 tablet by mouth 3 times daily for 30 days. Do not take 3rd dose after 5 PM      Last Filled:  1/20/23    Patient Phone Number: [phone] (home)     Last appt: 12/15/2022   Next appt: Visit date not found    Last OARRS:   RX Monitoring 12/15/2022   Periodic Controlled Substance Monitoring No signs of potential drug abuse or diversion identified.      PDMP Monitoring:    Last PDMP Chuck Chowdhury as Reviewed [facility]):  Review User Review Instant Review Result   Grafton Sanma 12/15/2022  1:26 PM Reviewed PDMP [1]     Preferred Pharmacy:   [address], [address] [place] [address] 5533 [facility] [phone]  Phone: [phone] Fax: [phone]

## 2023-02-14 NOTE — TELEPHONE ENCOUNTER
methylphenidate (RITALIN) 20 MG tablet [[id number]]     Order Details  Dose: 20 mg Route: Oral Frequency: 3 TIMES DAILY   Dispense Quantity: 90 tablet Refills: 0    Note to Pharmacy: Dose change         Sig: Take 1 tablet by mouth 3 times daily for 30 days.  Do not take 3rd dose after 5 PM   Rosezena Prader [address] [place] [address], [address] [place] [address] [address] [place] [id number]   Phone:  [phone]  Fax:  [phone]

## 2023-03-14 DIAGNOSIS — F90.2 ATTENTION DEFICIT HYPERACTIVITY DISORDER (ADHD), COMBINED TYPE: ICD-10-CM

## 2023-03-14 RX ORDER — METHYLPHENIDATE HYDROCHLORIDE 20 MG/1
20 TABLET ORAL 3 TIMES DAILY
Qty: 90 TABLET | Refills: 0 | Status: SHIPPED | OUTPATIENT
Start: 2023-03-14 | End: 2023-04-13

## 2023-03-14 NOTE — TELEPHONE ENCOUNTER
methylphenidate (RITALIN) 20 MG tablet   [address], [address] [place] [address] [address]   14 [facility], [address] [place] [address]   Phone:  [phone]  Fax:  [phone]

## 2023-03-14 NOTE — TELEPHONE ENCOUNTER
Medication:   Requested Prescriptions     Pending Prescriptions Disp Refills    methylphenidate (RITALIN) 20 MG tablet 90 tablet 0     Sig: Take 1 tablet by mouth 3 times daily for 30 days. Do not take 3rd dose after 5 PM      Last Filled:  2/14/23    Patient Phone Number: [phone] (home)     Last appt: 12/15/2022   Next appt: Visit date not found    Last OARRS:   RX Monitoring 12/15/2022   Periodic Controlled Substance Monitoring No signs of potential drug abuse or diversion identified.      PDMP Monitoring:    Last PDMP [facility] as Reviewed [facility]):  Review User Review Instant Review Result   Branden Carrington 12/15/2022  1:26 PM Reviewed PDMP [1]     Preferred Pharmacy:   [address], [address] [place] [address] 1564 [facility]  Elizabeth Benjamin [phone]  Phone: [phone] Fax: [phone]

## 2023-05-10 DIAGNOSIS — F90.2 ATTENTION DEFICIT HYPERACTIVITY DISORDER (ADHD), COMBINED TYPE: ICD-10-CM

## 2023-05-10 RX ORDER — METHYLPHENIDATE HYDROCHLORIDE 20 MG/1
20 TABLET ORAL 3 TIMES DAILY
Qty: 90 TABLET | Refills: 0 | Status: SHIPPED | OUTPATIENT
Start: 2023-05-10 | End: 2023-06-09

## 2023-05-10 NOTE — TELEPHONE ENCOUNTER
methylphenidate (RITALIN) 20 MG tablet     [address], [address] [place] [address]   14 [facility], [address] [place] [address]   Phone:  [phone]  Fax:  [phone]

## 2023-05-10 NOTE — TELEPHONE ENCOUNTER
Medication:   Requested Prescriptions     Pending Prescriptions Disp Refills    methylphenidate (RITALIN) 20 MG tablet 90 tablet 0     Sig: Take 1 tablet by mouth 3 times daily for 30 days. Do not take 3rd dose after 5 PM      Last Filled:  4/11/2023    Patient Phone Number: [phone] (home)     Last appt: 12/15/2022   Next appt: Visit date not found    Last OARRS:   RX Monitoring 12/15/2022   Periodic Controlled Substance Monitoring No signs of potential drug abuse or diversion identified.      PDMP Monitoring:    Last PDMP Dennis Roy as Reviewed [facility]):  Review User Review Instant Review Result   German Shreiff 12/15/2022  1:26 PM Reviewed PDMP [1]     Preferred Pharmacy:   [address], [address] [facility] 3326 [facility] 051 [phone]  Phone: [phone] Fax: [phone]

## 2023-06-08 DIAGNOSIS — F90.2 ATTENTION DEFICIT HYPERACTIVITY DISORDER (ADHD), COMBINED TYPE: ICD-10-CM

## 2023-06-08 RX ORDER — METHYLPHENIDATE HYDROCHLORIDE 20 MG/1
20 TABLET ORAL 3 TIMES DAILY
Qty: 90 TABLET | Refills: 0 | Status: SHIPPED | OUTPATIENT
Start: 2023-06-08 | End: 2023-07-08

## 2023-06-08 NOTE — TELEPHONE ENCOUNTER
Medication:   Requested Prescriptions     Pending Prescriptions Disp Refills    methylphenidate (RITALIN) 20 MG tablet 90 tablet 0     Sig: Take 1 tablet by mouth 3 times daily for 30 days. Do not take 3rd dose after 5 PM      Last Filled:  5/10/23    Patient Phone Number: [phone] (home)     Last appt: 12/15/2022   Next appt: Visit date not found    Last OARRS:   RX Monitoring 12/15/2022   Periodic Controlled Substance Monitoring No signs of potential drug abuse or diversion identified.      PDMP Monitoring:    Last PDMP [facility] as Reviewed [facility]):  Review User Review Instant Review Result   Gabriela Malagon 12/15/2022  1:26 PM Reviewed PDMP [1]     Preferred Pharmacy:   [address], [address] [place] [address] [address] [place] [address]  Elizabeth Solis [phone]  Phone: [phone] Fax: [phone]

## 2023-06-08 NOTE — TELEPHONE ENCOUNTER
methylphenidate (RITALIN) 20 MG tablet       Vaibhav Moreira [address] [place] [address], [place] [address]   14 [facility], [address] [place] [address]   Phone:  [phone]  Fax:  [phone]

## 2023-07-07 ENCOUNTER — TELEPHONE (OUTPATIENT)
Dept: FAMILY MEDICINE CLINIC | Age: 43
End: 2023-07-07

## 2023-07-07 DIAGNOSIS — F90.2 ATTENTION DEFICIT HYPERACTIVITY DISORDER (ADHD), COMBINED TYPE: ICD-10-CM

## 2023-07-07 RX ORDER — METHYLPHENIDATE HYDROCHLORIDE 20 MG/1
20 TABLET ORAL 3 TIMES DAILY
Qty: 90 TABLET | Refills: 0 | Status: SHIPPED | OUTPATIENT
Start: 2023-07-07 | End: 2023-08-06

## 2023-07-07 NOTE — TELEPHONE ENCOUNTER
methylphenidate (RITALIN) 20 MG tablet [[id number]    [facility] [address]   Phone:  [phone]  Fax:  [phone]

## 2023-07-07 NOTE — TELEPHONE ENCOUNTER
Medication:   Requested Prescriptions     Pending Prescriptions Disp Refills    methylphenidate (RITALIN) 20 MG tablet 90 tablet 0     Sig: Take 1 tablet by mouth 3 times daily for 30 days. Do not take 3rd dose after 5 PM      Last Filled:  6/8/23    Patient Phone Number: [phone] (home)     Last appt: 12/15/2022   Next appt: 12/18/2023    Last OARRS:   RX Monitoring 12/15/2022   Periodic Controlled Substance Monitoring No signs of potential drug abuse or diversion identified.      PDMP Monitoring:    Last PDMP Sivakumar Mae as Reviewed [facility]):  Review User Review Instant Review Result   Chantel De Leonsurendra 12/15/2022  1:26 PM Reviewed PDMP [1]     [address]   [address] [address]  Phone: [phone] Fax: [phone]

## 2023-07-12 ENCOUNTER — TELEPHONE (OUTPATIENT)
Dept: FAMILY MEDICINE CLINIC | Age: 43
End: 2023-07-12

## 2023-07-12 DIAGNOSIS — F90.2 ATTENTION DEFICIT HYPERACTIVITY DISORDER (ADHD), COMBINED TYPE: ICD-10-CM

## 2023-07-12 RX ORDER — METHYLPHENIDATE HYDROCHLORIDE 20 MG/1
20 TABLET ORAL 3 TIMES DAILY
Qty: 90 TABLET | Refills: 0 | Status: SHIPPED | OUTPATIENT
Start: 2023-07-12 | End: 2023-08-11

## 2023-07-12 NOTE — TELEPHONE ENCOUNTER
----- Message from Lucio Rodney sent at 7/12/2023  2:56 PM EDT -----  Subject: Message to Provider    QUESTIONS  Information for Provider? prescription called in to pharmacy they didn't   have the medication, needs his prescription called into a different   pharmacy [address], needs to be filled   tomorrow. ---------------------------------------------------------------------------  --------------  Lolis Melendez GCYY  [phone]; OK to leave message on voicemail  ---------------------------------------------------------------------------  --------------  SCRIPT ANSWERS  Relationship to Patient?  Self

## 2023-07-12 NOTE — TELEPHONE ENCOUNTER
methylphenidate (RITALIN) 20 MG tablet       [facility] #[id number] - [address] [phone]    Patient is requesting this be sent over today, this pharmacy is is short on supplies.    Please Advise

## 2023-08-07 DIAGNOSIS — F90.2 ATTENTION DEFICIT HYPERACTIVITY DISORDER (ADHD), COMBINED TYPE: ICD-10-CM

## 2023-08-07 RX ORDER — METHYLPHENIDATE HYDROCHLORIDE 20 MG/1
20 TABLET ORAL 3 TIMES DAILY
Qty: 90 TABLET | Refills: 0 | Status: SHIPPED | OUTPATIENT
Start: 2023-08-10 | End: 2023-09-09

## 2023-08-07 NOTE — TELEPHONE ENCOUNTER
Medication:   Requested Prescriptions     Pending Prescriptions Disp Refills    methylphenidate (RITALIN) 20 MG tablet 90 tablet 0     Sig: Take 1 tablet by mouth 3 times daily for 30 days. Do not take 3rd dose after 5 PM      Last Filled:  7/12/23    Patient Phone Number: [phone] (home)     Last appt: 12/15/2022   Next appt: 12/18/2023    Last OARRS:   RX Monitoring 12/15/2022   Periodic Controlled Substance Monitoring No signs of potential drug abuse or diversion identified.      PDMP Monitoring:    Last PDMP Colvis Peñaloza as Reviewed [facility]):  Review User Review Instant Review Result   Lanny Mackarabella 12/15/2022  1:26 PM Reviewed PDMP [1]     Preferred Pharmacy:   [facility] [address] [place] [address] [phone] - F [phone]  [address] [facility] [address]  Phone: [phone] Fax: 0 [address] [address]  Phone: [phone] Fax: [phone]

## 2023-08-07 NOTE — TELEPHONE ENCOUNTER
methylphenidate (RITALIN) 20 MG tablet         [facility] STORE [address] [place] [address]   Phone:  [phone]  Fax:  [phone]

## 2023-09-05 DIAGNOSIS — F90.2 ATTENTION DEFICIT HYPERACTIVITY DISORDER (ADHD), COMBINED TYPE: ICD-10-CM

## 2023-09-05 RX ORDER — METHYLPHENIDATE HYDROCHLORIDE 20 MG/1
20 TABLET ORAL 3 TIMES DAILY
Qty: 90 TABLET | Refills: 0 | Status: SHIPPED | OUTPATIENT
Start: 2023-09-09 | End: 2023-10-09

## 2023-09-05 NOTE — TELEPHONE ENCOUNTER
Medication:   Requested Prescriptions     Pending Prescriptions Disp Refills    methylphenidate (RITALIN) 20 MG tablet 90 tablet 0     Sig: Take 1 tablet by mouth 3 times daily for 30 days. Do not take 3rd dose after 5 PM      Last Filled:  8/10/23    Patient Phone Number: [phone] (home)     Last appt: 12/15/2022   Next appt: 12/18/2023    Last OARRS:   RX Monitoring 12/15/2022   Periodic Controlled Substance Monitoring No signs of potential drug abuse or diversion identified.      PDMP Monitoring:    Last PDMP Sherlyn Robles as Reviewed [facility]):  Review User Review Instant Review Result   Viviane Watts 12/15/2022  1:26 PM Reviewed PDMP [1]     Preferred Pharmacy:   [facility] [address] [place] [address] Drive [phone] -  [phone]  [address] [facility] [address]  Phone: [phone] Fax: 0 [address] [place] [address]  Phone: [phone] Fax: [phone]

## 2023-09-05 NOTE — TELEPHONE ENCOUNTER
methylphenidate (RITALIN) 20 MG tablet       [facility] DR[facility] STORE [address] [place] [address]   Phone:  [phone]  Fax:  [phone]

## 2023-10-04 DIAGNOSIS — F90.2 ATTENTION DEFICIT HYPERACTIVITY DISORDER (ADHD), COMBINED TYPE: ICD-10-CM

## 2023-10-04 RX ORDER — METHYLPHENIDATE HYDROCHLORIDE 20 MG/1
20 TABLET ORAL 3 TIMES DAILY
Qty: 90 TABLET | Refills: 0 | Status: SHIPPED | OUTPATIENT
Start: 2023-10-04 | End: 2023-11-03

## 2023-10-04 NOTE — TELEPHONE ENCOUNTER
Medication:   Requested Prescriptions     Pending Prescriptions Disp Refills    methylphenidate (RITALIN) 20 MG tablet 90 tablet 0     Sig: Take 1 tablet by mouth 3 times daily for 30 days. Do not take 3rd dose after 5 PM      Last Filled:  9/9/23    Patient Phone Number: [phone] (home)     Last appt: 12/15/2022   Next appt: 12/18/2023    Last OARRS:       12/15/2022     1:26 PM   RX Monitoring   Periodic Controlled Substance Monitoring No signs of potential drug abuse or diversion identified.      PDMP Monitoring:    Last PDMP Rakan Bill as Reviewed [facility]):  Review User Review Instant Review Result   Ezequielyobany Cerratohew 12/15/2022  1:26 PM Reviewed PDMP [1]     Preferred Pharmacy:   [facility] [address] [place] [phone] -  [phone]  [address] [place] [phone]  Phone: [phone] Fax: 0 [address] [address]  Phone: [phone] Fax: [phone]

## 2023-10-04 NOTE — TELEPHONE ENCOUNTER
----- Message from Vinayak Maravilla sent at 10/3/2023 12:20 PM EDT -----  Subject: Refill Request    QUESTIONS  Name of Medication? methylphenidate (RITALIN) 20 MG tablet  Patient-reported dosage and instructions? three times a day   How many days do you have left? 9  Preferred Pharmacy? [address] [facility] #[id number]  Pharmacy phone number (if available)? [phone]  Additional Information for Provider? 90pills  ---------------------------------------------------------------------------  --------------  CALL BACK INFO  What is the best way for the office to contact you? OK to leave message on   voicemail  Preferred Call Back Phone Number? [phone]  ---------------------------------------------------------------------------  --------------  SCRIPT ANSWERS  Relationship to Patient?  Self

## 2023-10-30 DIAGNOSIS — F90.2 ATTENTION DEFICIT HYPERACTIVITY DISORDER (ADHD), COMBINED TYPE: ICD-10-CM

## 2023-10-30 RX ORDER — METHYLPHENIDATE HYDROCHLORIDE 20 MG/1
20 TABLET ORAL 3 TIMES DAILY
Qty: 90 TABLET | Refills: 0 | Status: SHIPPED | OUTPATIENT
Start: 2023-10-30 | End: 2023-11-29

## 2023-10-30 NOTE — TELEPHONE ENCOUNTER
Medication:   Requested Prescriptions     Pending Prescriptions Disp Refills    methylphenidate (RITALIN) 20 MG tablet 90 tablet 0     Sig: Take 1 tablet by mouth 3 times daily for 30 days. Do not take 3rd dose after 5 PM      Last Filled:  10/4/2023    Patient Phone Number: [phone] (home)     Last appt: 12/15/2022   Next appt: 12/18/2023    Last OARRS:       12/15/2022     1:26 PM   RX Monitoring   Periodic Controlled Substance Monitoring No signs of potential drug abuse or diversion identified.      PDMP Monitoring:    Last PDMP Trudy Gordon as Reviewed [facility]):  Review User Review Instant Review Result   Jose Cherri 12/15/2022  1:26 PM Reviewed PDMP [1]     Preferred Pharmacy:   [facility] STORE [address] [place] [phone] - F [phone]  [address] [facility] [address]  Phone: [phone] Fax: 0 [address] [address]  Phone: [phone] Fax: [phone]

## 2023-11-06 DIAGNOSIS — F90.2 ATTENTION DEFICIT HYPERACTIVITY DISORDER (ADHD), COMBINED TYPE: ICD-10-CM

## 2023-11-06 RX ORDER — METHYLPHENIDATE HYDROCHLORIDE 20 MG/1
20 TABLET ORAL 3 TIMES DAILY
Qty: 90 TABLET | Refills: 0 | Status: SHIPPED | OUTPATIENT
Start: 2023-11-06 | End: 2023-12-06

## 2023-11-06 NOTE — TELEPHONE ENCOUNTER
Medication:   Requested Prescriptions     Pending Prescriptions Disp Refills    methylphenidate (RITALIN) 20 MG tablet 90 tablet 0     Sig: Take 1 tablet by mouth 3 times daily for 30 days. Do not take 3rd dose after 5 PM          Patient Phone Number: [phone] (home)     Last appt: 12/15/2022   Next appt: 12/18/2023    Last OARRS:       12/15/2022     1:26 PM   RX Monitoring   Periodic Controlled Substance Monitoring No signs of potential drug abuse or diversion identified.      PDMP Monitoring:    Last PDMP Maria De Jesus Quintana as Reviewed [facility]):  Review User Review Instant Review Result   Santosh Fallon 12/15/2022  1:26 PM Reviewed PDMP [1]     Preferred Pharmacy:   [facility] [address] [place] [address] Drive [phone] - F [phone]  [address] [facility] [address]  Phone: [phone] Fax: [address] [address] [facility] [place] [address]  Phone: [phone] Fax: [facility] [address] [facility], [address] [place] [address],[address] [place] [address] [address] [address]  Phone: [phone] Fax: [phone]

## 2023-11-06 NOTE — TELEPHONE ENCOUNTER
methylphenidate (RITALIN) 20 MG tablet     [facility] [address] [facility], [address] [place] [address] [phone]    Pt called and stated that the previous pharmacy had a back order. Would like prescription sent here instead. Pt would like a phone call once filled and is also out of medication. Please advise. ..

## 2023-11-28 DIAGNOSIS — F90.2 ATTENTION DEFICIT HYPERACTIVITY DISORDER (ADHD), COMBINED TYPE: ICD-10-CM

## 2023-11-28 RX ORDER — METHYLPHENIDATE HYDROCHLORIDE 20 MG/1
20 TABLET ORAL 3 TIMES DAILY
Qty: 90 TABLET | Refills: 0 | Status: SHIPPED | OUTPATIENT
Start: 2023-11-28 | End: 2023-12-28

## 2023-11-28 NOTE — TELEPHONE ENCOUNTER
Medication:   Requested Prescriptions      No prescriptions requested or ordered in this encounter      Last Filled:  11/6/23    Patient Phone Number: [phone] (home)     Last appt: 12/15/2022   Next appt: 12/18/2023    Last OARRS:       12/15/2022     1:26 PM   RX Monitoring   Periodic Controlled Substance Monitoring No signs of potential drug abuse or diversion identified.      PDMP Monitoring:    Last PDMP Sivakumar Mae as Reviewed [facility]):  Review User Review Instant Review Result   Chantel Kim 12/15/2022  1:26 PM Reviewed PDMP [1]     Preferred Pharmacy:   [facility] [address] [place] [address],[address]  [address] [place] [address]  Phone: [phone] Fax: [phone]

## 2023-12-29 DIAGNOSIS — F90.2 ATTENTION DEFICIT HYPERACTIVITY DISORDER (ADHD), COMBINED TYPE: ICD-10-CM

## 2023-12-29 RX ORDER — METHYLPHENIDATE HYDROCHLORIDE 20 MG/1
20 TABLET ORAL 3 TIMES DAILY
Qty: 90 TABLET | Refills: 0 | Status: SHIPPED | OUTPATIENT
Start: 2024-01-01 | End: 2024-01-31

## 2023-12-29 NOTE — TELEPHONE ENCOUNTER
methylphenidate (RITALIN) 20 MG tablet     [facility] STORE [address] [place] [address]  Phone: [phone]  Fax: [phone]

## 2023-12-29 NOTE — TELEPHONE ENCOUNTER
Medication:   Requested Prescriptions     Pending Prescriptions Disp Refills    methylphenidate (RITALIN) 20 MG tablet 90 tablet 0     Sig: Take 1 tablet by mouth 3 times daily for 30 days. Do not take 3rd dose after 5 PM      Last Filled:  11/28/23    Patient Phone Number: [phone] (home)     Last appt: 12/18/2023   Next appt: Visit date not found    Last OARRS:       12/18/2023     2:51 PM   RX Monitoring   Periodic Controlled Substance Monitoring No signs of potential drug abuse or diversion identified.      PDMP Monitoring:    Last PDMP Rakan Bill as Reviewed [facility]):  Review User Review Instant Review Result   Ezequielyobany Cerratohew 12/18/2023  2:51 PM Reviewed PDMP [1]     Preferred Pharmacy:   [address] [address]  Phone: [phone] Fax: Minerva Bao [address] [facility], [address] [place] [address],[address]  [facility] [address] [address] [address]  Phone: [phone] Fax: [phone]

## 2024-01-02 ENCOUNTER — TELEPHONE (OUTPATIENT)
Dept: FAMILY MEDICINE CLINIC | Age: 44
End: 2024-01-02

## 2024-01-02 DIAGNOSIS — S06.9X0S TRAUMATIC BRAIN INJURY, WITHOUT LOSS OF CONSCIOUSNESS, SEQUELA (HCC): ICD-10-CM

## 2024-01-02 DIAGNOSIS — F90.2 ATTENTION DEFICIT HYPERACTIVITY DISORDER (ADHD), COMBINED TYPE: Primary | ICD-10-CM

## 2024-01-02 DIAGNOSIS — F90.2 ATTENTION DEFICIT HYPERACTIVITY DISORDER (ADHD), COMBINED TYPE: ICD-10-CM

## 2024-01-02 RX ORDER — METHYLPHENIDATE HYDROCHLORIDE 20 MG/1
20 TABLET ORAL 3 TIMES DAILY
Qty: 90 TABLET | Refills: 0 | Status: SHIPPED | OUTPATIENT
Start: 2024-01-02 | End: 2024-01-02 | Stop reason: RX

## 2024-01-02 RX ORDER — DEXTROAMPHETAMINE SACCHARATE, AMPHETAMINE ASPARTATE, DEXTROAMPHETAMINE SULFATE AND AMPHETAMINE SULFATE 3.75; 3.75; 3.75; 3.75 MG/1; MG/1; MG/1; MG/1
15 TABLET ORAL 3 TIMES DAILY
Qty: 90 TABLET | Refills: 0 | Status: SHIPPED | OUTPATIENT
Start: 2024-01-02 | End: 2024-02-01

## 2024-01-02 NOTE — TELEPHONE ENCOUNTER
methylphenidate (RITALIN) 20 MG tablet [[id number]]     Per the patient all [facility] are out of the Methylphenidate and the patient is in need of something that can be substituted. Per the patient [facility] has 15 ml Adderall Quantity 90 if that can be used as a substitute.  Please give him a call at [phone] to let him know if this is possible.     [id number] - [place], [place] - [address] [phone] - F [phone] [[id number]]

## 2024-01-02 NOTE — TELEPHONE ENCOUNTER
methylphenidate (RITALIN) 20 MG tablet [[id number]]     Please reroute to the pharmacy below. The other pharmacy is out and not able to obtain at this time.     [facility] #[id number] - [place] - [address] [phone] - F [phone] [[id number]]

## 2024-01-25 ENCOUNTER — TELEPHONE (OUTPATIENT)
Dept: FAMILY MEDICINE CLINIC | Age: 44
End: 2024-01-25

## 2024-01-25 DIAGNOSIS — S06.9X0S TRAUMATIC BRAIN INJURY, WITHOUT LOSS OF CONSCIOUSNESS, SEQUELA (HCC): ICD-10-CM

## 2024-01-25 DIAGNOSIS — F90.2 ATTENTION DEFICIT HYPERACTIVITY DISORDER (ADHD), COMBINED TYPE: Primary | ICD-10-CM

## 2024-01-25 RX ORDER — METHYLPHENIDATE HYDROCHLORIDE 60 MG/1
60 CAPSULE, EXTENDED RELEASE ORAL DAILY
Qty: 30 CAPSULE | Refills: 0 | Status: SHIPPED | OUTPATIENT
Start: 2024-01-31 | End: 2024-03-01

## 2024-01-25 NOTE — TELEPHONE ENCOUNTER
Sent script but won't be able to get until next week (1/31) as won't be due for next refill until then

## 2024-01-25 NOTE — TELEPHONE ENCOUNTER
Pt called with concerns of supply issues with rx methylphenidate 20mg. Pt stated he does not like the adderall 15mg and would like to get back on the methylphenidate. Pt said the only pharmacy who has the rx in stock is [facility] however; its only methylphenidate extended release 60mg take 1 tablet by mouth daily. Would like to try this instead.     Please advise...    [facility] #[id number] - [place], [place] - [address] [phone] - F [phone] [[id number]]

## 2024-01-29 DIAGNOSIS — F90.2 ATTENTION DEFICIT HYPERACTIVITY DISORDER (ADHD), COMBINED TYPE: Primary | ICD-10-CM

## 2024-01-29 RX ORDER — METHYLPHENIDATE HYDROCHLORIDE 60 MG/1
60 CAPSULE, EXTENDED RELEASE ORAL EVERY MORNING
Qty: 30 CAPSULE | Refills: 0 | Status: SHIPPED | OUTPATIENT
Start: 2024-01-29 | End: 2024-02-28

## 2024-01-29 NOTE — TELEPHONE ENCOUNTER
Pharmacist called for medication to be resent , the version we sent over they do not have in stock.  He asked the rx be changed to this because they do have this one.  Please resend.

## 2024-02-22 ENCOUNTER — TELEPHONE (OUTPATIENT)
Dept: FAMILY MEDICINE CLINIC | Age: 44
End: 2024-02-22

## 2024-02-22 NOTE — TELEPHONE ENCOUNTER
Pt called stating he has been trying to find a pharmacy that would fill his prescription.all have been out of his medication. He informed me he was given a substitute for his (methylphenidate (RITALIN) 20 MG tablet )    Which was :    Methylphenidate HCl ER, XR, 60 MG CP24     methylphenidate (METADATE CD) 60 MG extended release capsule     &    amphetamine-dextroamphetamine (ADDERALL, 15MG,) 15 MG tablet     He stated he didn't like any of those. He found a pharmacy who has his correct medication but the pharmacist suggested the dosage be lowered to 10 MG & take 2 pills at once 3x day. Their more likely to have the supply.    Pharmacy:     [facility]  [address] [place] [address], [address] [phone]    [facility] callback# [phone]    Please advise...

## 2024-02-23 DIAGNOSIS — F90.2 ATTENTION DEFICIT HYPERACTIVITY DISORDER (ADHD), COMBINED TYPE: ICD-10-CM

## 2024-02-23 DIAGNOSIS — S06.9X0S TRAUMATIC BRAIN INJURY, WITHOUT LOSS OF CONSCIOUSNESS, SEQUELA (HCC): Primary | ICD-10-CM

## 2024-02-23 RX ORDER — METHYLPHENIDATE HYDROCHLORIDE 10 MG/1
20 TABLET ORAL 3 TIMES DAILY
Qty: 180 TABLET | Refills: 0 | Status: SHIPPED | OUTPATIENT
Start: 2024-02-23 | End: 2024-03-24

## 2024-03-15 DIAGNOSIS — S06.9X0S TRAUMATIC BRAIN INJURY, WITHOUT LOSS OF CONSCIOUSNESS, SEQUELA (HCC): ICD-10-CM

## 2024-03-15 DIAGNOSIS — F90.2 ATTENTION DEFICIT HYPERACTIVITY DISORDER (ADHD), COMBINED TYPE: ICD-10-CM

## 2024-03-15 RX ORDER — METHYLPHENIDATE HYDROCHLORIDE 10 MG/1
20 TABLET ORAL 3 TIMES DAILY
Qty: 180 TABLET | Refills: 0 | Status: SHIPPED | OUTPATIENT
Start: 2024-03-15 | End: 2024-04-14

## 2024-03-15 NOTE — TELEPHONE ENCOUNTER
Medication:   Requested Prescriptions     Pending Prescriptions Disp Refills    methylphenidate (RITALIN) 10 MG tablet 180 tablet 0     Sig: Take 2 tablets by mouth 3 times daily for 30 days. Max Daily Amount: 60 mg      Last Filled:  2/23/24    Patient Phone Number: [phone] (home)     Last appt: 12/18/2023   Next appt: Visit date not found    Last OARRS:       12/18/2023     2:51 PM   RX Monitoring   Periodic Controlled Substance Monitoring No signs of potential drug abuse or diversion identified.     PDMP Monitoring:    Last PDMP Jonatan as Reviewed ([place]):  Review User Review Instant Review Result   JENNIE DUARTE 12/29/2023  4:51 PM Reviewed PDMP [1]     Preferred Pharmacy:   [id number] - [place], [place] - [address] -  [phone] - F [phone] [address] [facility] [address]  Phone: [phone] Fax: [phone]

## 2024-03-15 NOTE — TELEPHONE ENCOUNTER
methylphenidate (RITALIN) 10 MG tablet [[id number]]       [facility] #[id number] - [place], [place] - [address] [facility] -  [phone] -  [phone]  [address] [facility] [address]  Phone: [phone]  Fax: [phone]

## 2024-04-15 DIAGNOSIS — F90.2 ATTENTION DEFICIT HYPERACTIVITY DISORDER (ADHD), COMBINED TYPE: ICD-10-CM

## 2024-04-15 DIAGNOSIS — S06.9X0S TRAUMATIC BRAIN INJURY, WITHOUT LOSS OF CONSCIOUSNESS, SEQUELA (HCC): ICD-10-CM

## 2024-04-15 RX ORDER — METHYLPHENIDATE HYDROCHLORIDE 10 MG/1
20 TABLET ORAL 3 TIMES DAILY
Qty: 180 TABLET | Refills: 0 | Status: CANCELLED | OUTPATIENT
Start: 2024-04-15 | End: 2024-05-15

## 2024-04-15 RX ORDER — METHYLPHENIDATE HYDROCHLORIDE 20 MG/1
20 TABLET ORAL 3 TIMES DAILY
Qty: 90 TABLET | Refills: 0 | Status: SHIPPED | OUTPATIENT
Start: 2024-04-15 | End: 2024-05-15

## 2024-04-15 NOTE — TELEPHONE ENCOUNTER
methylphenidate (RITALIN) 20 MG tablet     [id number] - [address] - P [phone] -  [phone] [[id number]]     Pt stated rx needs to say 20mg 3 times daily with a quantity of 60 in order for insurance to cover.     Please advise...

## 2024-04-15 NOTE — TELEPHONE ENCOUNTER
Medication:   Requested Prescriptions     Pending Prescriptions Disp Refills    methylphenidate (RITALIN) 20 MG tablet 90 tablet 0     Sig: Take 1 tablet by mouth 3 times daily for 30 days. Do not take 3rd dose after 5 PM      Last Filled:  3/15/24    Patient Phone Number: [phone] (home)     Last appt: 12/18/2023   Next appt: Visit date not found    Last OARRS:       12/18/2023     2:51 PM   RX Monitoring   Periodic Controlled Substance Monitoring No signs of potential drug abuse or diversion identified.     PDMP Monitoring:    Last PDMP Jonatan as Reviewed ([place]):  Review User Review Instant Review Result   JENNIE DUARTE 12/29/2023  4:51 PM Reviewed PDMP [1]     Preferred Pharmacy:   [facility] - [place], [place] - [address] [facility] [phone] - F [phone]  [address] [facility] [address]  Phone: [phone] Fax: [phone]    [facility] #[id number] [place], [place] - [address] [facility] [phone] - F [phone]  [address] [place] [address]  Phone: [phone] Fax: [phone]

## 2024-05-10 DIAGNOSIS — F90.2 ATTENTION DEFICIT HYPERACTIVITY DISORDER (ADHD), COMBINED TYPE: ICD-10-CM

## 2024-05-10 RX ORDER — METHYLPHENIDATE HYDROCHLORIDE 20 MG/1
20 TABLET ORAL 3 TIMES DAILY
Qty: 90 TABLET | Refills: 0 | Status: SHIPPED | OUTPATIENT
Start: 2024-05-18 | End: 2024-06-17

## 2024-05-10 NOTE — TELEPHONE ENCOUNTER
Medication:   Requested Prescriptions     Pending Prescriptions Disp Refills    methylphenidate (RITALIN) 20 MG tablet 90 tablet 0     Sig: Take 1 tablet by mouth 3 times daily for 30 days. Do not take 3rd dose after 5 PM      Last Filled:  4/15    Patient Phone Number: [phone] (home)     Last appt: 12/18/2023   Next appt: Visit date not found    Last OARRS:       12/18/2023     2:51 PM   RX Monitoring   Periodic Controlled Substance Monitoring No signs of potential drug abuse or diversion identified.     PDMP Monitoring:    Last PDMP Jonatan as Reviewed ([place]):  Review User Review Instant Review Result   JENNIE DUARTE 12/29/2023  4:51 PM Reviewed PDMP [1]     Preferred Pharmacy:   [facility] - [place], [place] - [address] [facility] [phone] - F [phone]  [address] [facility] [address]  Phone: [phone] Fax: [phone]    [facility] #[id number] [place], [place] - [address] [place] [address] [phone] - F [phone]  [address] [place] [address]  Phone: [phone] Fax: [phone]

## 2024-05-17 ENCOUNTER — TELEPHONE (OUTPATIENT)
Dept: FAMILY MEDICINE CLINIC | Age: 44
End: 2024-05-17

## 2024-05-17 DIAGNOSIS — F90.2 ATTENTION DEFICIT HYPERACTIVITY DISORDER (ADHD), COMBINED TYPE: ICD-10-CM

## 2024-05-17 RX ORDER — METHYLPHENIDATE HYDROCHLORIDE 20 MG/1
20 TABLET ORAL 3 TIMES DAILY
Qty: 90 TABLET | Refills: 0 | Status: SHIPPED | OUTPATIENT
Start: 2024-05-17 | End: 2024-06-16

## 2024-05-17 NOTE — TELEPHONE ENCOUNTER
Patient called and states that his Ritalin medication refill has the direction of \"do not fill until 5/18/24\" but his pharmacy is closed on the weekends. He says that his medication is hard to get, and the pharmacy currently has some in stock. He would like for a new prescription to be sent to the pharmacy allowing him to be able to pick it up as soon as possible today before his pharmacy sells out of it. He would like a call when this is done, or if it can't be done.    Please advise      methylphenidate (RITALIN) 20 MG tablet        [facility] #[id number] - [place], [place] - [address] [place] [address] [phone] - F [phone]  [address] [place] [address]  Phone: [phone]  Fax: [phone]

## 2024-06-10 DIAGNOSIS — F90.2 ATTENTION DEFICIT HYPERACTIVITY DISORDER (ADHD), COMBINED TYPE: ICD-10-CM

## 2024-06-10 RX ORDER — METHYLPHENIDATE HYDROCHLORIDE 20 MG/1
20 TABLET ORAL 3 TIMES DAILY
Qty: 90 TABLET | Refills: 0 | Status: SHIPPED | OUTPATIENT
Start: 2024-06-10 | End: 2024-07-10

## 2024-06-10 NOTE — TELEPHONE ENCOUNTER
Medication:   Requested Prescriptions     Pending Prescriptions Disp Refills    methylphenidate (RITALIN) 20 MG tablet 90 tablet 0     Sig: Take 1 tablet by mouth 3 times daily for 30 days. Do not take 3rd dose after 5 PM      Last Filled:  05/17/24    Patient Phone Number: [phone] (home)     Last appt: 12/18/2023   Next appt: Visit date not found    Last OARRS:       12/18/2023     2:51 PM   RX Monitoring   Periodic Controlled Substance Monitoring No signs of potential drug abuse or diversion identified.     PDMP Monitoring:    Last PDMP Jonatan as Reviewed ([place]):  Review User Review Instant Review Result   KIMMY VARGAS 5/10/2024  1:02 PM Reviewed PDMP [1]     Preferred Pharmacy:   [facility] - [place], [place] - [address] [facility] [phone] - F [phone]  [address] [facility] [address]  Phone: [phone] Fax: [phone]    [facility] #06106 [place], [place] - [address] [facility] [phone] - F [phone]  [address] [place] [address]  Phone: [phone] Fax: [phone]

## 2024-07-08 DIAGNOSIS — F90.2 ATTENTION DEFICIT HYPERACTIVITY DISORDER (ADHD), COMBINED TYPE: ICD-10-CM

## 2024-07-08 RX ORDER — METHYLPHENIDATE HYDROCHLORIDE 20 MG/1
20 TABLET ORAL 3 TIMES DAILY
Qty: 90 TABLET | Refills: 0 | Status: SHIPPED | OUTPATIENT
Start: 2024-07-08 | End: 2024-08-07

## 2024-07-08 NOTE — TELEPHONE ENCOUNTER
methylphenidate (RITALIN) 20 MG tablet     [facility] #[id number] - [place], [place] - [address] [facility] [phone] -  [phone]  [address] [facility] [address]  Phone: [phone]  Fax: [phone]

## 2024-07-08 NOTE — TELEPHONE ENCOUNTER
Medication:   Requested Prescriptions     Pending Prescriptions Disp Refills    methylphenidate (RITALIN) 20 MG tablet 90 tablet 0     Sig: Take 1 tablet by mouth 3 times daily for 30 days. Do not take 3rd dose after 5 PM      Last Filled:  6/10/24    Patient Phone Number: [phone] (home)     Last appt: 12/18/2023   Next appt: 12/18/2024    Last OARRS:       12/18/2023     2:51 PM   RX Monitoring   Periodic Controlled Substance Monitoring No signs of potential drug abuse or diversion identified.     PDMP Monitoring:    Last PDMP Jonatan as Reviewed ([place]):  Review User Review Instant Review Result   KIMMY VARGAS 5/10/2024  1:02 PM Reviewed PDMP [1]     Preferred Pharmacy:   [facility] - [place], [place] - [address] [facility] [phone] - F [phone]  [address] [facility] [address]  Phone: [phone] Fax: [phone]    [facility] #[id number] [place], [place] - [address] [facility] [phone] - F [phone]  [address] [place] [address]  Phone: [phone] Fax: [phone]

## 2024-07-10 NOTE — TELEPHONE ENCOUNTER
Disp Refills Start End    methylphenidate (RITALIN) 20 MG tablet 60 tablet 0 12/15/2020 1/14/2021    Sig - Route:  Take 1 tablet by mouth 2 times daily for 30 days. - Oral      [address] [facility] 115 [phone]       Provider out of office    Please advise
Medication:   Requested Prescriptions     Pending Prescriptions Disp Refills    methylphenidate (RITALIN) 20 MG tablet 60 tablet 0     Sig: Take 1 tablet by mouth 2 times daily for 30 days. Last Filled:  12/15/20    Patient Phone Number: [phone] (home)     Last appt: 12/3/2020   Next appt: Visit date not found    Last OARRS: No flowsheet data found.   PDMP Monitoring:    Last PDMP Gurmeet Garcia as Reviewed [facility]):  Review User Review Instant Review Result   Renee Moore 12/3/2019 12:25 PM Reviewed PDMP [1]     Preferred Pharmacy:   [address], [address] [place] [address] [address] [place] [address]  Elizabeth Solis [address]  Phone: [phone] Fax: [phone]
[]Met  []Partially met  [x]Not met   Short Term Goal 3: Patient will complete bilateral coordination task with moderate assistance in 5 trials. Patient engaged in BUE reaching task x6 repetitions to reaching/placing rings on stacking with moderate assistance.  []Met  [x]Partially met  []Not met   Short Term Goal 4: Patient will tolerate 5 minutes or greater of BUE weight bearing or strengthening without splints to improve shoulder stability and independence with tasks, with minimal assistance. Patient tolerated 3 minutes of BUE on exercise ball with min-modA for placement and stabilization of BUE.    []Met  [x]Partially met  []Not met   Short Term Goal 5: Initiate caregiver education/HEP. Continue with current HEP. [x]Met  []Partially met  []Not met   OBJECTIVE  Overall good tolerance during therapist-directed tasks this date. Frequent verbal, visual, and tactile prompts were given for maintaining attention to tasks. Increased cueing for engagement and participation in tasks.     Tolerated OT/PT cotx well.       EDUCATION  Education provided to patient/family/caregiver: Discussed contents and purpose of session with caregiver at conclusion of session.     Method of Education:     [x]Discussion     []Demonstration    []Written     []Other  Evaluation of Patient’s Response to Education:        [x]Patient and or Caregiver verbalized understanding  []Patient and or Caregiver Demonstrated without assistance   []Patient and or Caregiver Demonstrated with assistance  []Needs additional instruction to demonstrate understanding of education    ASSESSMENT  Patient tolerated today’s treatment session:    [x]Good   []Fair   []Poor  Limitations/difficulties with treatment session due to:   Goal Assessment: [x]No Change    []Improved  Comments:    PLAN  [x]Continue with current plan of care  []Medical “Hold”  []Hold per patient request  []Change Treatment plan:  []Insurance hold  []Other     TIME   Time Treatment session was

## 2024-08-01 DIAGNOSIS — F90.2 ATTENTION DEFICIT HYPERACTIVITY DISORDER (ADHD), COMBINED TYPE: ICD-10-CM

## 2024-08-01 NOTE — TELEPHONE ENCOUNTER
methylphenidate (RITALIN) 20 MG tablet       [facility] #[id number] - [place], [place] - [address] [facility] [phone] -  [phone]  [address] [facility] [address]  Phone: [phone]  Fax: [phone]

## 2024-08-02 RX ORDER — METHYLPHENIDATE HYDROCHLORIDE 20 MG/1
20 TABLET ORAL 3 TIMES DAILY
Qty: 90 TABLET | Refills: 0 | Status: SHIPPED | OUTPATIENT
Start: 2024-08-07 | End: 2024-09-06

## 2024-08-02 NOTE — TELEPHONE ENCOUNTER
Medication:   Requested Prescriptions     Pending Prescriptions Disp Refills    methylphenidate (RITALIN) 20 MG tablet 90 tablet 0     Sig: Take 1 tablet by mouth 3 times daily for 30 days. Do not take 3rd dose after 5 PM      Last Filled:  7/8/24    Patient Phone Number: [phone] (home)     Last appt: 12/18/2023   Next appt: 12/18/2024    Last OARRS:       12/18/2023     2:51 PM   RX Monitoring   Periodic Controlled Substance Monitoring No signs of potential drug abuse or diversion identified.     PDMP Monitoring:    Last PDMP [place] Reviewed ([place]):  Review User Review Instant Review Result   KIMMY VARGAS 5/10/2024  1:02 PM Reviewed PDMP [1]     Preferred Pharmacy:   [id number] - [place], [place] - [address] [facility] [phone] - F [phone]  [address] [facility] [address]  Phone: [phone] Fax: [phone]    [facility] #[id number] [place], [place] - [address] [facility] [phone] - F [phone]  [address] [place] [address]  Phone: [phone] Fax: [phone]

## 2024-09-03 DIAGNOSIS — F90.2 ATTENTION DEFICIT HYPERACTIVITY DISORDER (ADHD), COMBINED TYPE: ICD-10-CM

## 2024-09-03 RX ORDER — METHYLPHENIDATE HYDROCHLORIDE 20 MG/1
20 TABLET ORAL 3 TIMES DAILY
Qty: 90 TABLET | Refills: 0 | Status: SHIPPED | OUTPATIENT
Start: 2024-09-03 | End: 2024-10-03

## 2024-09-03 NOTE — TELEPHONE ENCOUNTER
Patient refill :    methylphenidate (RITALIN) 20 MG tablet     [facility] #[id number] - [place], [place] - [address] [facility] [phone] -  [phone] [address] [facility] [address]  Phone: [phone]  Fax: [phone]     Please advise....

## 2024-09-03 NOTE — TELEPHONE ENCOUNTER
Medication:   Requested Prescriptions     Pending Prescriptions Disp Refills    methylphenidate (RITALIN) 20 MG tablet 90 tablet 0     Sig: Take 1 tablet by mouth 3 times daily for 30 days. Do not take 3rd dose after 5 PM      Last Filled:  8/7/24  Provider out of office.     Patient Phone Number: [phone] (home)     Last appt: 12/18/2023   Next appt: 12/18/2024    Last OARRS:       12/18/2023     2:51 PM   RX Monitoring   Periodic Controlled Substance Monitoring No signs of potential drug abuse or diversion identified.     PDMP Monitoring:    Last PDMP [place] Reviewed ([place]):  Review User Review Instant Review Result   KIMMY VARGAS 5/10/2024  1:02 PM Reviewed PDMP [1]     Preferred Pharmacy:   [facility] - [place], [place] - [address] [facility] [phone] - F [phone]  [address] [facility] [address]  Phone: [phone] Fax: [phone]    [facility] #[id number] [place], [place] - [address] [place] [address] [phone] - F [phone]  [address] [place] [address]  Phone: [phone] Fax: [phone]

## 2024-10-02 DIAGNOSIS — F90.2 ATTENTION DEFICIT HYPERACTIVITY DISORDER (ADHD), COMBINED TYPE: ICD-10-CM

## 2024-10-02 RX ORDER — METHYLPHENIDATE HYDROCHLORIDE 20 MG/1
20 TABLET ORAL 3 TIMES DAILY
Qty: 90 TABLET | Refills: 0 | Status: SHIPPED | OUTPATIENT
Start: 2024-10-02 | End: 2024-11-01

## 2024-10-02 NOTE — TELEPHONE ENCOUNTER
methylphenidate (RITALIN) 20 MG tablet [[id number]]     [facility] #[id number] - [place], [place] - [address] [facility] -  [phone] -  [phone]  [address] [facility] [address]  Phone: [phone]  Fax: [phone]

## 2024-10-28 DIAGNOSIS — F90.2 ATTENTION DEFICIT HYPERACTIVITY DISORDER (ADHD), COMBINED TYPE: ICD-10-CM

## 2024-10-28 RX ORDER — METHYLPHENIDATE HYDROCHLORIDE 20 MG/1
20 TABLET ORAL 3 TIMES DAILY
Qty: 90 TABLET | Refills: 0 | Status: SHIPPED | OUTPATIENT
Start: 2024-10-28 | End: 2024-11-27

## 2024-10-28 NOTE — TELEPHONE ENCOUNTER
methylphenidate (RITALIN) 20 MG tablet [[id number]]     [facility] #[id number] [address] - P [phone] -  [phone] [[id number]]       Patient has reached out to the pharmacy to verify they would be able to fill the prescription for him.     Please advise.

## 2024-10-28 NOTE — TELEPHONE ENCOUNTER
Medication:   Requested Prescriptions     Pending Prescriptions Disp Refills    methylphenidate (RITALIN) 20 MG tablet 90 tablet 0     Sig: Take 1 tablet by mouth 3 times daily for 30 days. Do not take 3rd dose after 5 PM      Last Filled:  10/2/24    Patient Phone Number: [phone] (home)     Last appt: 12/18/2023   Next appt: 12/18/2024    Last OARRS:       12/18/2023     2:51 PM   RX Monitoring   Periodic Controlled Substance Monitoring No signs of potential drug abuse or diversion identified.     PDMP Monitoring:    Last PDMP [place] Reviewed ([place]):  Review User Review Instant Review Result   KIMMY VARGAS 5/10/2024  1:02 PM Reviewed PDMP [1]     Preferred Pharmacy:   [facility] - [place], [place] - [address] [facility] [phone] - F [phone]  [address] [facility] [address]  Phone: [phone] Fax: [phone]    [facility] #[id number] [place], [place] - [address] [facility] [phone] - F [phone]  [address] [place] [address]  Phone: [phone] Fax: [phone]

## 2024-11-21 DIAGNOSIS — F90.2 ATTENTION DEFICIT HYPERACTIVITY DISORDER (ADHD), COMBINED TYPE: ICD-10-CM

## 2024-11-21 RX ORDER — METHYLPHENIDATE HYDROCHLORIDE 20 MG/1
20 TABLET ORAL 3 TIMES DAILY
Qty: 90 TABLET | Refills: 0 | Status: SHIPPED | OUTPATIENT
Start: 2024-11-21 | End: 2024-12-21

## 2024-11-21 NOTE — TELEPHONE ENCOUNTER
Medication:   Requested Prescriptions     Pending Prescriptions Disp Refills    methylphenidate (RITALIN) 20 MG tablet 90 tablet 0     Sig: Take 1 tablet by mouth 3 times daily for 30 days. Do not take 3rd dose after 5 PM      Last Filled:  10/28/2024    Patient Phone Number: [phone] (home)     Last appt: 12/18/2023   Next appt: 12/18/2024    Last OARRS:       12/18/2023     2:51 PM   RX Monitoring   Periodic Controlled Substance Monitoring No signs of potential drug abuse or diversion identified.     PDMP Monitoring:    Last PDMP [place] Reviewed ([place]):  Review User Review Instant Review Result   KIMMY VARGAS 5/10/2024  1:02 PM Reviewed PDMP [1]     Preferred Pharmacy:   [facility] - [place], [place] - [address] [facility] [phone] - F [phone]  [address] [facility] [address]  Phone: [phone] Fax: [phone]    [facility] #[id number] [place], [place] - [address] [facility] [phone] - F [phone]  [address] [place] [address]  Phone: [phone] Fax: [phone]

## 2024-11-21 NOTE — TELEPHONE ENCOUNTER
methylphenidate (RITALIN) 20 MG tablet     [facility]168 - [facility] [address] [phone] - F [phone] [[id number]]

## 2024-12-16 DIAGNOSIS — F90.2 ATTENTION DEFICIT HYPERACTIVITY DISORDER (ADHD), COMBINED TYPE: ICD-10-CM

## 2024-12-16 RX ORDER — METHYLPHENIDATE HYDROCHLORIDE 20 MG/1
20 TABLET ORAL 3 TIMES DAILY
Qty: 90 TABLET | Refills: 0 | Status: SHIPPED | OUTPATIENT
Start: 2024-12-16 | End: 2025-01-15

## 2024-12-16 NOTE — TELEPHONE ENCOUNTER
methylphenidate (RITALIN) 20 MG tablet 90 Tablets     [facility]ORE #[id number] - [address] - P [phone] -  [phone] [[id number]]

## 2024-12-16 NOTE — TELEPHONE ENCOUNTER
Medication:   Requested Prescriptions     Pending Prescriptions Disp Refills    methylphenidate (RITALIN) 20 MG tablet 90 tablet 0     Sig: Take 1 tablet by mouth 3 times daily for 30 days. Do not take 3rd dose after 5 PM      Last Filled:  11/21/24    Patient Phone Number: [phone] (home)     Last appt: 12/18/2023   Next appt: 12/18/2024    Last OARRS:       12/18/2023     2:51 PM   RX Monitoring   Periodic Controlled Substance Monitoring No signs of potential drug abuse or diversion identified.     PDMP Monitoring:    Last PDMP [place] Reviewed ([place]):  Review User Review Instant Review Result   KIMMY VARGAS 5/10/2024  1:02 PM Reviewed PDMP [1]     Preferred Pharmacy:   [facility] - [place], [place] - [address] [facility] [phone] - F [phone]  [address] [facility] [address]  Phone: [phone] Fax: [phone]    [facility] STORE #[id number] [place], [place] - [address] [facility] [phone] - F [phone]  [address] [place] [address]  Phone: [phone] Fax: [phone]

## 2024-12-18 ENCOUNTER — OFFICE VISIT (OUTPATIENT)
Dept: FAMILY MEDICINE CLINIC | Age: 44
End: 2024-12-18
Payer: MEDICARE

## 2024-12-18 VITALS
BODY MASS INDEX: 27.46 KG/M2 | TEMPERATURE: 97.5 F | SYSTOLIC BLOOD PRESSURE: 110 MMHG | HEIGHT: 70 IN | WEIGHT: 191.8 LBS | HEART RATE: 73 BPM | DIASTOLIC BLOOD PRESSURE: 80 MMHG | OXYGEN SATURATION: 99 %

## 2024-12-18 DIAGNOSIS — Z23 NEED FOR VACCINATION: ICD-10-CM

## 2024-12-18 DIAGNOSIS — F17.200 TOBACCO DEPENDENCE: ICD-10-CM

## 2024-12-18 DIAGNOSIS — F90.2 ATTENTION DEFICIT HYPERACTIVITY DISORDER (ADHD), COMBINED TYPE: ICD-10-CM

## 2024-12-18 DIAGNOSIS — F60.5 OBSESSIVE-COMPULSIVE PERSONALITY DISORDER (HCC): ICD-10-CM

## 2024-12-18 DIAGNOSIS — Z87.820 HISTORY OF TRAUMATIC BRAIN INJURY: ICD-10-CM

## 2024-12-18 DIAGNOSIS — E78.2 MIXED HYPERLIPIDEMIA: ICD-10-CM

## 2024-12-18 DIAGNOSIS — Z00.00 WELL ADULT EXAM: Primary | ICD-10-CM

## 2024-12-18 DIAGNOSIS — Z12.11 SCREEN FOR COLON CANCER: ICD-10-CM

## 2024-12-18 PROCEDURE — 99214 OFFICE O/P EST MOD 30 MIN: CPT | Performed by: FAMILY MEDICINE

## 2024-12-18 PROCEDURE — G0439 PPPS, SUBSEQ VISIT: HCPCS | Performed by: FAMILY MEDICINE

## 2024-12-18 PROCEDURE — 90661 CCIIV3 VAC ABX FR 0.5 ML IM: CPT | Performed by: FAMILY MEDICINE

## 2024-12-18 PROCEDURE — 91320 SARSCV2 VAC 30MCG TRS-SUC IM: CPT | Performed by: FAMILY MEDICINE

## 2024-12-18 PROCEDURE — 90480 ADMN SARSCOV2 VAC 1/ONLY CMP: CPT | Performed by: FAMILY MEDICINE

## 2024-12-18 PROCEDURE — G0008 ADMIN INFLUENZA VIRUS VAC: HCPCS | Performed by: FAMILY MEDICINE

## 2024-12-18 SDOH — ECONOMIC STABILITY: FOOD INSECURITY: WITHIN THE PAST 12 MONTHS, THE FOOD YOU BOUGHT JUST DIDN'T LAST AND YOU DIDN'T HAVE MONEY TO GET MORE.: NEVER TRUE

## 2024-12-18 SDOH — ECONOMIC STABILITY: FOOD INSECURITY: WITHIN THE PAST 12 MONTHS, YOU WORRIED THAT YOUR FOOD WOULD RUN OUT BEFORE YOU GOT MONEY TO BUY MORE.: NEVER TRUE

## 2024-12-18 SDOH — ECONOMIC STABILITY: INCOME INSECURITY: HOW HARD IS IT FOR YOU TO PAY FOR THE VERY BASICS LIKE FOOD, HOUSING, MEDICAL CARE, AND HEATING?: NOT HARD AT ALL

## 2024-12-18 ASSESSMENT — PATIENT HEALTH QUESTIONNAIRE - PHQ9
SUM OF ALL RESPONSES TO PHQ QUESTIONS 1-9: 0
SUM OF ALL RESPONSES TO PHQ9 QUESTIONS 1 & 2: 0
1. LITTLE INTEREST OR PLEASURE IN DOING THINGS: NOT AT ALL
SUM OF ALL RESPONSES TO PHQ QUESTIONS 1-9: 0
2. FEELING DOWN, DEPRESSED OR HOPELESS: NOT AT ALL

## 2024-12-18 NOTE — PATIENT INSTRUCTIONS
Personalized Preventative Plan for Mikie Henderson    Medicare offers a range of preventative health benefits. Some of the tests and screenings are paid in full while others may be subject to a deductible, co-insurance and/or copay. Some of these benefits include a comprehensive review of your medical history including lifestyle, illnesses that may run in your family and various assessments and screenings as appropriate. After reviewing your medical record and screening and assessments performed today your provider may have ordered/recommended immunizations, labs, imaging and/or referrals for you. A list of these orders as well as your Preventative Care list are included within your After Visit Summary for your review.      --Bring in copy of living will and healthcare power of  for your chart.            --Dermatologists:      [facility]    [phone]   MD Rosa Bridegs MD Emily Fisher, MD Rachel Gustin, MD Dena Elkeeb, MD      [facility]:  Dr. Roberto Moctezuma  [address] [address]  Phone: [phone]    [place]:  Dr. Casas [facility]  [address] [facility]  [phone]    [place]:  Dr. JUAN R Chou  [address] [facility]  [address]  [place] [address]  Phone: [phone]    [place]  [address] [place] [place] [address]  Phone: [phone]    [place]:  Dr. Ronen Guerra  [address] [facility]  [address]  [place] [address]  Phone: [phone]    [facility]:  [address] [facility] [place] [address]  Phone: [phone]        [facility]  [address] [place], [place] [address]  Phone: [phone]       [facility]  Dr. Anisa Magdaleno  [phone]  [address] [place]       [facility]  Dr. Chapis Weir MD  [phone])

## 2024-12-18 NOTE — PROGRESS NOTES
Immunization(s) given during visit:     Immunizations Administered       Name Date Dose Route    COVID-19, PFIZER, (age 12y+), IM, 30mcg/0.3mL 12/18/2024 0.3 mL Intramuscular    Site: Deltoid- Left    Lot: [id number]    NDC: 2485-3408-94    Influenza, FLUCELVAX, (age 6 mo+) IM, Trivalent PF, 0.5mL 12/18/2024 0.5 mL Intramuscular    Site: Deltoid- Right    Lot: [id number]    NDC: 12963-201-34             Patient instructed to remain in clinic for 20 minutes after injection and was advised to report any adverse reaction to me immediately.       
(HCC)  -Stable, continue current medications.    Attention deficit hyperactivity disorder (ADHD), combined type  -Stable, continue current medications.  Controlled Substances Monitoring:   OARRS report reviewed         Mixed hyperlipidemia  -     Lipid Panel; Future  -     Comprehensive Metabolic Panel; Future  Recheck labs  (Labs ordered contributed to MDM)    Tobacco dependence  Pt reports trying to quit, strongly encouraged  Discussed could try medications if needed    History of traumatic brain injury  -Stable, continue current medications.    Need for vaccination  -     Influenza, FLUCELVAX Trivalent, (age 6 mo+) IM, Preservative Free, 0.5mL  -     COVID-19, COMIRNATY, (age 12y+), IM, PF, 30mcg/0.3mL    Screen for colon cancer  -     Fecal DNA Colorectal cancer screening (Cologuard)    Other orders  -     FLUoxetine (PROZAC) 20 MG capsule; Take 1 capsule by mouth daily            Return in about 1 year (around 12/18/2025) for AWV, 30 min.           Portions of Note per  Meg Berg CMA AAMA with corrections and edits per Sherin Oden MD.  I agree with entirety of note and was present and performed history and physical.  I also confirm that the note above accurately reflects all work, treatment, procedures, and medical decision making performed by me, Sherin Oden MD    
old records/vital signs

## 2025-01-20 DIAGNOSIS — F90.2 ATTENTION DEFICIT HYPERACTIVITY DISORDER (ADHD), COMBINED TYPE: ICD-10-CM

## 2025-01-20 RX ORDER — METHYLPHENIDATE HYDROCHLORIDE 20 MG/1
20 TABLET ORAL 3 TIMES DAILY
Qty: 90 TABLET | Refills: 0 | Status: SHIPPED | OUTPATIENT
Start: 2025-01-20 | End: 2025-02-19

## 2025-01-20 NOTE — TELEPHONE ENCOUNTER
Medication:   Requested Prescriptions     Pending Prescriptions Disp Refills    methylphenidate (RITALIN) 20 MG tablet 90 tablet 0     Sig: Take 1 tablet by mouth 3 times daily for 30 days. Do not take 3rd dose after 5 PM      Last Filled:  12/16    Patient Phone Number: [phone] (home)     Last appt: 12/18/2024   Next appt: Visit date not found    Last OARRS:       12/18/2023     2:51 PM   RX Monitoring   Periodic Controlled Substance Monitoring No signs of potential drug abuse or diversion identified.     PDMP Monitoring:    Last PDMP [place] Reviewed ([place]):  Review User Review Instant Review Result   JENNIE DUARTE 12/26/2024  8:27 AM Reviewed PDMP [1]     Preferred Pharmacy:   [facility] - [place], [place] - [address] [facility] [phone] - F [phone]  [address] [facility] [address]  Phone: [phone] Fax: [phone]    [id number] [place], [place] - [address] [facility] [phone] - F [phone]  [address] [place] [address]  Phone: [phone] Fax: [phone]

## 2025-02-18 DIAGNOSIS — F90.2 ATTENTION DEFICIT HYPERACTIVITY DISORDER (ADHD), COMBINED TYPE: ICD-10-CM

## 2025-02-18 RX ORDER — METHYLPHENIDATE HYDROCHLORIDE 20 MG/1
20 TABLET ORAL 3 TIMES DAILY
Qty: 90 TABLET | Refills: 0 | Status: SHIPPED | OUTPATIENT
Start: 2025-02-18 | End: 2025-03-20

## 2025-02-18 NOTE — TELEPHONE ENCOUNTER
Medication:   Requested Prescriptions     Pending Prescriptions Disp Refills    methylphenidate (RITALIN) 20 MG tablet 90 tablet 0     Sig: Take 1 tablet by mouth 3 times daily for 30 days. Do not take 3rd dose after 5 PM      Last Filled:  1/20/2025    Patient Phone Number: [phone] (home)     Last appt: 12/18/2024   Next appt: Visit date not found    Last OARRS:       12/18/2023     2:51 PM   RX Monitoring   Periodic Controlled Substance Monitoring No signs of potential drug abuse or diversion identified.     PDMP Monitoring:    Last PDMP [place] Reviewed ([place]):  Review User Review Instant Review Result   JENNIE DUARTE 12/26/2024  8:27 AM Reviewed PDMP [1]     Preferred Pharmacy:   [facility] - [place], [place] - [address] -  [phone] - F [phone] [address] [facility] [address]  Phone: [phone] Fax: [phone]

## 2025-03-18 DIAGNOSIS — F90.2 ATTENTION DEFICIT HYPERACTIVITY DISORDER (ADHD), COMBINED TYPE: ICD-10-CM

## 2025-03-18 RX ORDER — METHYLPHENIDATE HYDROCHLORIDE 20 MG/1
20 TABLET ORAL 3 TIMES DAILY
Qty: 90 TABLET | Refills: 0 | Status: SHIPPED | OUTPATIENT
Start: 2025-03-18 | End: 2025-04-17

## 2025-03-18 NOTE — TELEPHONE ENCOUNTER
Valerie from [employer] calling stating there are forms that they need filled out by patients provider. She said provider can go on availity on their site and fill out forms for patients chronic condition. She said they are needing this information by no later than May 30, 2025 for patients coverage.   Valerie phone: [phone]    Please advise

## 2025-03-18 NOTE — TELEPHONE ENCOUNTER
Patient calling for a medication refill.    methylphenidate (RITALIN) 20 MG tablet [[id number]]     [facility] #168 - [place], [place] - [address] - P [phone] - F [phone] [address] [facility] [address]  Phone: [phone]  Fax: [phone]     Please advise.

## 2025-03-18 NOTE — TELEPHONE ENCOUNTER
Medication:   Requested Prescriptions     Pending Prescriptions Disp Refills    methylphenidate (RITALIN) 20 MG tablet 90 tablet 0     Sig: Take 1 tablet by mouth 3 times daily for 30 days. Do not take 3rd dose after 5 PM      Last Filled:  2/18/25    Patient Phone Number: [phone] (home)     Last appt: 12/18/2024   Next appt: Visit date not found    Last OARRS:       12/18/2023     2:51 PM   RX Monitoring   Periodic Controlled Substance Monitoring No signs of potential drug abuse or diversion identified.     PDMP Monitoring:    Last PDMP [place] Reviewed ([place]):  Review User Review Instant Review Result   JENNIE DUARTE 12/26/2024  8:27 AM Reviewed PDMP [1]     Preferred Pharmacy:   [facility] - [place], [place] - [address] [facility] [phone] - F [phone]  [address] [facility] [address]  Phone: [phone] Fax: [phone]    [facility]ORE #[id number] [place], [place] - [address] [facility] [phone] - F [phone]  [address] [place] [address]  Phone: [phone] Fax: [phone]

## 2025-03-24 ENCOUNTER — TELEPHONE (OUTPATIENT)
Dept: FAMILY MEDICINE CLINIC | Age: 45
End: 2025-03-24

## 2025-03-24 NOTE — TELEPHONE ENCOUNTER
Patient called in to let you know when he got his Methylphenidate refilled , they were only able to give him 60 because that is all they had in stock.    He wanted to let you know he will be requesting next fill early due to this.     Patient would like a call back so he knows you received this message and how you would like him to proceed.  VM ok if no answer.

## 2025-03-24 NOTE — TELEPHONE ENCOUNTER
Left detailed message informing that it is noted in his chart that he will be requesting refill early.   Told him to mention this when he is due for a refill.

## 2025-04-08 DIAGNOSIS — F90.2 ATTENTION DEFICIT HYPERACTIVITY DISORDER (ADHD), COMBINED TYPE: ICD-10-CM

## 2025-04-08 NOTE — TELEPHONE ENCOUNTER
Medication:   Requested Prescriptions     Pending Prescriptions Disp Refills    methylphenidate (RITALIN) 20 MG tablet 30 tablet 0     Sig: Take 1 tablet by mouth 3 times daily for 30 days. Do not take 3rd dose after 5 PM      Provider out of office.     Patient Phone Number: [phone] (home)     Last appt: 12/18/2024   Next appt: Visit date not found    Last OARRS:       12/18/2023     2:51 PM   RX Monitoring   Periodic Controlled Substance Monitoring No signs of potential drug abuse or diversion identified.     PDMP Monitoring:    Last PDMP [place] Reviewed ([place]):  Review User Review Instant Review Result   JENNIE DUARTE 12/26/2024  8:27 AM Reviewed PDMP [1]     Preferred Pharmacy:   [facility] - [place], [place] - [address] [facility] [phone] - F [phone]  [address] [facility] [address]  Phone: [phone] Fax: [phone]    [facility]52895 [place], [place] - [address] [facility] [phone] - F [phone]  [address] [place] [address]  Phone: [phone] Fax: [phone]

## 2025-04-09 RX ORDER — METHYLPHENIDATE HYDROCHLORIDE 20 MG/1
20 TABLET ORAL 3 TIMES DAILY
Qty: 30 TABLET | Refills: 0 | Status: SHIPPED | OUTPATIENT
Start: 2025-04-09 | End: 2025-05-09

## 2025-04-09 NOTE — TELEPHONE ENCOUNTER
Informed pharmacy that this is the rest of the prescription that they didn't have the full amount for the last time.

## 2025-04-09 NOTE — TELEPHONE ENCOUNTER
Pharmacy called and the quantity verses directions does not match it has 30 needs to be 90.     Please advise.

## 2025-04-15 DIAGNOSIS — F90.2 ATTENTION DEFICIT HYPERACTIVITY DISORDER (ADHD), COMBINED TYPE: ICD-10-CM

## 2025-04-15 RX ORDER — METHYLPHENIDATE HYDROCHLORIDE 20 MG/1
20 TABLET ORAL 3 TIMES DAILY
Qty: 90 TABLET | Refills: 0 | Status: SHIPPED | OUTPATIENT
Start: 2025-04-21 | End: 2025-05-21

## 2025-04-15 NOTE — TELEPHONE ENCOUNTER
Medication:   Requested Prescriptions     Pending Prescriptions Disp Refills    methylphenidate (RITALIN) 20 MG tablet 270 tablet 0     Sig: Take 1 tablet by mouth 3 times daily for 90 days. Do not take 3rd dose after 5 PM Max Daily Amount: 60 mg      Last Filled:  4/9/25 but not full script    Patient Phone Number: [phone] (home)     Last appt: 12/18/2024   Next appt: Visit date not found    Last OARRS:       12/18/2023     2:51 PM   RX Monitoring   Periodic Controlled Substance Monitoring No signs of potential drug abuse or diversion identified.     PDMP Monitoring:    Last PDMP [place] Reviewed ([place]):  Review User Review Instant Review Result   JENNIE DUARTE 12/26/2024  8:27 AM Reviewed PDMP [1]     Preferred Pharmacy:   [facility] - [place], [place] - [address] [facility] [phone] - F [phone]  [address] [facility] [address]  Phone: [phone] Fax: [phone]    [facility]ORE #[id number] [place], [place] - [address] [place] [address] [phone] - F [phone]  [address] [place] [address]  Phone: [phone] Fax: [phone]

## 2025-04-15 NOTE — TELEPHONE ENCOUNTER
Pt called in requesting a refill on  methylphenidate (RITALIN) 20 MG tablet to be sent to   [facility] #168 - [facility] [address]     He stated it should be a 90 day supply.

## 2025-05-14 DIAGNOSIS — F90.2 ATTENTION DEFICIT HYPERACTIVITY DISORDER (ADHD), COMBINED TYPE: ICD-10-CM

## 2025-05-14 RX ORDER — METHYLPHENIDATE HYDROCHLORIDE 20 MG/1
20 TABLET ORAL 3 TIMES DAILY
Qty: 90 TABLET | Refills: 0 | Status: SHIPPED | OUTPATIENT
Start: 2025-05-14 | End: 2025-06-13

## 2025-05-14 NOTE — TELEPHONE ENCOUNTER
Medication:   Requested Prescriptions     Pending Prescriptions Disp Refills    methylphenidate (RITALIN) 20 MG tablet 90 tablet 0     Sig: Take 1 tablet by mouth 3 times daily for 30 days. Do not take 3rd dose after 5 PM Max Daily Amount: 60 mg      Last Filled:  4/21/25    Patient Phone Number: [phone] (home)     Last appt: 12/18/2024   Next appt: Visit date not found    Last OARRS:       12/18/2023     2:51 PM   RX Monitoring   Periodic Controlled Substance Monitoring No signs of potential drug abuse or diversion identified.     PDMP Monitoring:    Last PDMP [place] Reviewed ([place]):  Review User Review Instant Review Result   JENNIE DUARTE 4/15/2025  5:07 PM Reviewed PDMP [1]     Preferred Pharmacy:   [facility] - [place], [place] - [address] [facility] [phone] - F [phone]  [address] [facility] [address]  Phone: [phone] Fax: [phone]    [facility] #61555 [place], [place] - [address] [facility] [phone] - F [phone]  [address] [place] [address]  Phone: [phone] Fax: [phone]

## 2025-05-14 NOTE — TELEPHONE ENCOUNTER
Patient calling requesting a refill on medication     methylphenidate (RITALIN) 20 MG tablet [[id number]]    [facility] #168 - [place], [place] - [address] - P [phone] - F [phone] [address] [facility] [facility] [address]  Phone: [phone]  Fax: [phone]     Please advise

## 2025-06-12 DIAGNOSIS — F90.2 ATTENTION DEFICIT HYPERACTIVITY DISORDER (ADHD), COMBINED TYPE: ICD-10-CM

## 2025-06-12 RX ORDER — METHYLPHENIDATE HYDROCHLORIDE 20 MG/1
20 TABLET ORAL 3 TIMES DAILY
Qty: 90 TABLET | Refills: 0 | Status: SHIPPED | OUTPATIENT
Start: 2025-06-12 | End: 2025-07-12

## 2025-06-12 NOTE — TELEPHONE ENCOUNTER
Medication:   Requested Prescriptions     Pending Prescriptions Disp Refills    methylphenidate (RITALIN) 20 MG tablet 90 tablet 0     Sig: Take 1 tablet by mouth 3 times daily for 30 days. Do not take 3rd dose after 5 PM Max Daily Amount: 60 mg      Last Filled:      Patient Phone Number: [phone] (home)     Last appt: 12/18/2024   Next appt: Visit date not found    Last OARRS:       12/18/2023     2:51 PM   RX Monitoring   Periodic Controlled Substance Monitoring No signs of potential drug abuse or diversion identified.     PDMP Monitoring:    Last PDMP [place] Reviewed ([place]  Review User Review Instant Review Result   JENNIE DUARTE 4/15/2025  5:07 PM Reviewed PDMP [1]     Preferred Pharmacy:   [facility] - [place], [place] - [address] -  [phone] - F [phone] [address] [facility] [address]  Phone: [phone] Fax: [phone]

## 2025-06-12 NOTE — TELEPHONE ENCOUNTER
Patient called in needing a refill for:    methylphenidate (RITALIN) 20 MG tablet [[id number]]     [facility] #168 - [place], [place] - [address] - P [phone] - F [phone] [address] [facility] [address]  Phone: [phone]  Fax: [phone]

## 2025-07-14 DIAGNOSIS — F90.2 ATTENTION DEFICIT HYPERACTIVITY DISORDER (ADHD), COMBINED TYPE: ICD-10-CM

## 2025-07-14 RX ORDER — METHYLPHENIDATE HYDROCHLORIDE 20 MG/1
20 TABLET ORAL 3 TIMES DAILY
Qty: 90 TABLET | Refills: 0 | Status: SHIPPED | OUTPATIENT
Start: 2025-07-14 | End: 2025-07-14 | Stop reason: SDUPTHER

## 2025-07-14 RX ORDER — METHYLPHENIDATE HYDROCHLORIDE 20 MG/1
20 TABLET ORAL 3 TIMES DAILY
Qty: 90 TABLET | Refills: 0 | Status: SHIPPED | OUTPATIENT
Start: 2025-07-14 | End: 2025-08-13

## 2025-07-14 NOTE — TELEPHONE ENCOUNTER
Initial pharmacy doesn't have med. He would like the script sent to different pharmacy.     methylphenidate (RITALIN) 20 MG tablet     [id number] [place] - [address] [phone] - F [phone] [[id number]]

## 2025-07-14 NOTE — TELEPHONE ENCOUNTER
Patient called in requesting a refill on  methylphenidate (RITALIN) 20 MG tablet to be sent to   [facility] #168 - [facility] - [address]

## 2025-07-14 NOTE — TELEPHONE ENCOUNTER
Medication:   Requested Prescriptions     Pending Prescriptions Disp Refills    methylphenidate (RITALIN) 20 MG tablet 90 tablet 0     Sig: Take 1 tablet by mouth 3 times daily for 30 days. Do not take 3rd dose after 5 PM Max Daily Amount: 60 mg      Last Filled:  6/12/25    Patient Phone Number: [phone] (home)     Last appt: 12/18/2024   Next appt: 12/19/2025    Last OARRS:       12/18/2023     2:51 PM   RX Monitoring   Periodic Controlled Substance Monitoring No signs of potential drug abuse or diversion identified.     PDMP Monitoring:    Last PDMP [place] ([place]  Review User Review Instant Review Result   JENNIE DUARTE 4/15/2025  5:07 PM Reviewed PDMP [1]     Preferred Pharmacy:   [facility] [address] [facility] -  [phone] - F [phone]  [address] [facility] [address]  Phone: [phone] Fax: [phone]    [facility]ORE #74599 [place], [place] - [address] [place] [address] [phone] - F [phone]  [address] [facility] [address]  Phone: [phone] Fax: [phone]

## 2025-08-08 DIAGNOSIS — F90.2 ATTENTION DEFICIT HYPERACTIVITY DISORDER (ADHD), COMBINED TYPE: ICD-10-CM

## 2025-08-08 RX ORDER — METHYLPHENIDATE HYDROCHLORIDE 20 MG/1
20 TABLET ORAL 3 TIMES DAILY
Qty: 90 TABLET | Refills: 0 | Status: SHIPPED | OUTPATIENT
Start: 2025-08-12 | End: 2025-09-11